# Patient Record
Sex: FEMALE | Race: WHITE | NOT HISPANIC OR LATINO | Employment: OTHER | URBAN - METROPOLITAN AREA
[De-identification: names, ages, dates, MRNs, and addresses within clinical notes are randomized per-mention and may not be internally consistent; named-entity substitution may affect disease eponyms.]

---

## 2017-05-23 ENCOUNTER — HOSPITAL ENCOUNTER (EMERGENCY)
Facility: HOSPITAL | Age: 67
Discharge: HOME | End: 2017-05-24
Attending: EMERGENCY MEDICINE
Payer: MEDICARE

## 2017-05-23 DIAGNOSIS — S60.221A CONTUSION OF RIGHT HAND, INITIAL ENCOUNTER: Primary | ICD-10-CM

## 2017-05-23 PROCEDURE — 99283 EMERGENCY DEPT VISIT LOW MDM: CPT

## 2017-05-24 VITALS
RESPIRATION RATE: 18 BRPM | HEART RATE: 70 BPM | TEMPERATURE: 98 F | BODY MASS INDEX: 35.34 KG/M2 | HEIGHT: 64 IN | DIASTOLIC BLOOD PRESSURE: 92 MMHG | SYSTOLIC BLOOD PRESSURE: 153 MMHG | WEIGHT: 207 LBS | OXYGEN SATURATION: 100 %
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
## 2017-06-07 DIAGNOSIS — S20.109A: Primary | ICD-10-CM

## 2017-06-07 DIAGNOSIS — N64.4 BREAST PAIN: ICD-10-CM

## 2017-06-08 ENCOUNTER — HOSPITAL ENCOUNTER (OUTPATIENT)
Dept: RADIOLOGY | Facility: HOSPITAL | Age: 67
Discharge: HOME | End: 2017-06-08
Attending: OBSTETRICS & GYNECOLOGY
Payer: MEDICARE

## 2017-06-08 DIAGNOSIS — S20.109A: ICD-10-CM

## 2017-06-08 DIAGNOSIS — N64.4 BREAST PAIN: ICD-10-CM

## 2017-06-08 PROCEDURE — 77062 BREAST TOMOSYNTHESIS BI: CPT | Mod: 26,,, | Performed by: RADIOLOGY

## 2017-06-08 PROCEDURE — 77062 BREAST TOMOSYNTHESIS BI: CPT | Mod: TC

## 2017-06-08 PROCEDURE — 77066 DX MAMMO INCL CAD BI: CPT | Mod: 26,,, | Performed by: RADIOLOGY

## 2017-06-08 PROCEDURE — 76642 ULTRASOUND BREAST LIMITED: CPT | Mod: 26,LT,, | Performed by: RADIOLOGY

## 2017-06-08 PROCEDURE — 76642 ULTRASOUND BREAST LIMITED: CPT | Mod: TC,LT

## 2019-02-18 DIAGNOSIS — Z12.39 ENCOUNTER FOR SCREENING FOR MALIGNANT NEOPLASM OF BREAST: Primary | ICD-10-CM

## 2019-02-19 ENCOUNTER — HOSPITAL ENCOUNTER (OUTPATIENT)
Dept: RADIOLOGY | Facility: HOSPITAL | Age: 69
Discharge: HOME | End: 2019-02-19
Attending: OBSTETRICS & GYNECOLOGY
Payer: MEDICARE

## 2019-02-19 DIAGNOSIS — Z12.39 ENCOUNTER FOR SCREENING FOR MALIGNANT NEOPLASM OF BREAST: ICD-10-CM

## 2019-02-19 PROCEDURE — 77067 MAMMO DIGITAL SCREENING BILAT WITH TOMOSYNTHESIS_CAD: ICD-10-PCS | Mod: 26,,, | Performed by: RADIOLOGY

## 2019-02-19 PROCEDURE — 77067 SCR MAMMO BI INCL CAD: CPT | Mod: TC

## 2019-02-19 PROCEDURE — 77063 MAMMO DIGITAL SCREENING BILAT WITH TOMOSYNTHESIS_CAD: ICD-10-PCS | Mod: 26,,, | Performed by: RADIOLOGY

## 2019-02-19 PROCEDURE — 77063 BREAST TOMOSYNTHESIS BI: CPT | Mod: 26,,, | Performed by: RADIOLOGY

## 2019-02-19 PROCEDURE — 77067 SCR MAMMO BI INCL CAD: CPT | Mod: 26,,, | Performed by: RADIOLOGY

## 2019-03-15 ENCOUNTER — OFFICE VISIT (OUTPATIENT)
Dept: PODIATRY | Facility: CLINIC | Age: 69
End: 2019-03-15
Payer: MEDICARE

## 2019-03-15 VITALS
HEART RATE: 83 BPM | SYSTOLIC BLOOD PRESSURE: 104 MMHG | HEIGHT: 65 IN | DIASTOLIC BLOOD PRESSURE: 73 MMHG | WEIGHT: 207 LBS | BODY MASS INDEX: 34.49 KG/M2

## 2019-03-15 DIAGNOSIS — L60.0 INGROWN NAIL: Primary | ICD-10-CM

## 2019-03-15 DIAGNOSIS — M79.674 PAIN OF RIGHT GREAT TOE: ICD-10-CM

## 2019-03-15 PROCEDURE — 11750 EXCISION NAIL&NAIL MATRIX: CPT | Mod: T5,,, | Performed by: PODIATRIST

## 2019-03-15 PROCEDURE — 99213 PR OFFICE/OUTPT VISIT, EST, LEVL III, 20-29 MIN: ICD-10-PCS | Mod: 25,,, | Performed by: PODIATRIST

## 2019-03-15 PROCEDURE — 99213 OFFICE O/P EST LOW 20 MIN: CPT | Mod: 25,,, | Performed by: PODIATRIST

## 2019-03-15 PROCEDURE — 11750 NAIL REMOVAL: ICD-10-PCS | Mod: T5,,, | Performed by: PODIATRIST

## 2019-03-15 RX ORDER — IBUPROFEN 800 MG/1
1 TABLET ORAL
COMMUNITY

## 2019-03-15 RX ORDER — LYSINE HCL 500 MG
4 TABLET ORAL
COMMUNITY
End: 2023-02-20

## 2019-03-15 RX ORDER — CALCIUM CARBONATE 600 MG
TABLET ORAL
COMMUNITY
End: 2023-02-20

## 2019-03-15 RX ORDER — HYDROCODONE BITARTRATE AND ACETAMINOPHEN 7.5; 325 MG/1; MG/1
1 TABLET ORAL EVERY 6 HOURS PRN
Qty: 10 TABLET | Refills: 0 | Status: SHIPPED | OUTPATIENT
Start: 2019-03-15 | End: 2019-03-15 | Stop reason: CLARIF

## 2019-03-15 RX ORDER — VITAMIN B COMPLEX
1 CAPSULE ORAL DAILY
COMMUNITY
End: 2023-02-20

## 2019-03-15 RX ORDER — B-COMPLEX WITH VITAMIN C
1 TABLET ORAL
COMMUNITY
End: 2023-02-20

## 2019-03-15 RX ORDER — CEPHALEXIN 500 MG/1
500 CAPSULE ORAL EVERY 12 HOURS
Qty: 20 CAPSULE | Refills: 0 | Status: SHIPPED | OUTPATIENT
Start: 2019-03-15 | End: 2019-03-15

## 2019-03-15 RX ORDER — CEPHALEXIN 500 MG/1
500 CAPSULE ORAL EVERY 12 HOURS
Qty: 20 CAPSULE | Refills: 0 | Status: SHIPPED | OUTPATIENT
Start: 2019-03-15 | End: 2019-03-25

## 2019-03-15 RX ORDER — HYDROCODONE BITARTRATE AND ACETAMINOPHEN 7.5; 325 MG/1; MG/1
1 TABLET ORAL EVERY 6 HOURS PRN
Qty: 10 TABLET | Refills: 0 | Status: SHIPPED | OUTPATIENT
Start: 2019-03-15 | End: 2019-03-25

## 2019-03-15 RX ORDER — DIPHENHYDRAMINE HCL 25 MG
CAPSULE ORAL
COMMUNITY

## 2019-03-15 RX ORDER — SULFASALAZINE 500 MG/1
4 TABLET ORAL
COMMUNITY
End: 2023-02-20

## 2019-03-15 NOTE — PATIENT INSTRUCTIONS

## 2019-03-15 NOTE — PROGRESS NOTES
[address]  [place] [address]  Phone: [phone]   [phone]    Patient's PCP:Seema Cueva MD  Referring Provider: No ref. provider found    Subjective:      Chief Complaint:: Ingrown Toenail (right first toe)    ERASTO Anthony is a 68 y.o. female who presents with a complaint of  Ingrown toenail right first bilateral borders .  Current symptoms include pain.  Aggravating factors are closed toe shoes. Symptoms have gotten worse.Treatment to date have included saoks and pedicure. Patients rates pain 0/10 on pain scale.      Vitals:    03/15/19 1412   BP: 104/73   Pulse: 83     Shoe Size:     Past Surgical History:   Procedure Laterality Date    ABDOMINAL SURGERY      tummy tuck    BREAST BIOPSY      COLECTOMY-LAPAROSCOPIC Left 5/13/2014    [facility]    eye lid surgery      Bilateral    HAND SURGERY      HEMICOLECTOMY, LEFT Left 5/13/2014    [facility] at [facility]    HYSTERECTOMY      KNEE SURGERY      NECK SURGERY      SHOULDER SURGERY      TONSILLECTOMY       Past Medical History:   Diagnosis Date    Brain lesion 2011    Benign - no Tx    Diverticulitis     Genital herpes     Hepatitis A     Hyperlipidemia      History reviewed. No pertinent family history.     Social History:   Marital Status:   Alcohol History:  reports that she drinks alcohol.  Tobacco History:  reports that  has never smoked. she has never used smokeless tobacco.  Drug History:  reports that she does not use drugs.    Review of patient's allergies indicates:   Allergen Reactions    Corticosteroids (glucocorticoids)     Demerol [meperidine]     Unable to assess      aspartane       Current Outpatient Medications   Medication Sig Dispense Refill    ascorbic acid, vitamin C, (VITAMIN C) 100 MG tablet Take 100 mg by mouth once daily.      b complex vitamins capsule Take 1 capsule by mouth once daily.       B-complex with vitamin C (SUPER B COMPLEX-VITAMIN C ORAL) Super B Complex-Vitamin C      B-complex with vitamin C (Z-BEC OR EQUIV) tablet Take 1 tablet by mouth.      calcium carbonate (SUPER CALCIUM) 600 mg calcium (1,500 mg) Tab Super Calcium      calcium carbonate-mag oxide (SUPER BRAYAN-MAG) 333-167 mg Tab Super C      cephALEXin (KEFLEX) 500 MG capsule Take 1 capsule (500 mg total) by mouth every 12 (twelve) hours. for 10 days 20 capsule 0    diphenhydrAMINE (BENADRYL) 25 mg capsule Benadryl 25 mg capsule   Take 1 capsule every 4 hours by oral route.      garlic 1,500 mg Cap Take 4 capsules by mouth.      HYDROcodone-acetaminophen (NORCO) 7.5-325 mg per tablet Take 1 tablet by mouth every 6 (six) hours as needed for Pain. 10 tablet 0    ibuprofen (ADVIL,MOTRIN) 800 MG tablet Take 1 tablet by mouth.      multivitamin with iron (DAILY MULTIPLE VITAMINS/IRON ORAL) Take 2 tablets by mouth.      multivitamin with minerals tablet Take 1 tablet by mouth once daily.      sulfaSALAzine (AZULFIDINE) 500 mg Tab Take 4 capsules by mouth.      zinc sulfate (ZINC-15) 66 mg Tab zinc       No current facility-administered medications for this visit.        Review of Systems   Constitutional: Negative for chills, fatigue, fever and unexpected weight change.   HENT: Negative for hearing loss and trouble swallowing.    Eyes: Negative for photophobia and visual disturbance.   Respiratory: Negative for cough, shortness of breath and wheezing.    Cardiovascular: Negative for chest pain, palpitations and leg swelling.   Gastrointestinal: Negative for abdominal pain and nausea.   Genitourinary: Negative for dysuria and frequency.   Musculoskeletal: Negative for arthralgias, back pain and joint swelling.   Skin: Negative for rash.   Neurological: Negative for tremors, seizures, weakness, numbness and headaches.   Hematological: Does not bruise/bleed easily.         Objective:        Physical Exam:   Foot  Exam    General  General Appearance: appears stated age and healthy   Orientation: alert and oriented to person, place, and time   Affect: appropriate   Gait: unimpaired       Right Foot/Ankle     Neurovascular  Dorsalis pedis: 2+  Posterior tibial: 2+  Saphenous nerve sensation: normal  Tibial nerve sensation: normal  Superficial peroneal nerve sensation: normal  Deep peroneal nerve sensation: normal  Sural nerve sensation: normal    Comments  Ingrown toenail medial and lateral right first toe no purulent drainage  pain to palpation medial and lateral border.        Physical Exam   Cardiovascular:   Pulses:       Dorsalis pedis pulses are 2+ on the right side.        Posterior tibial pulses are 2+ on the right side.       Imaging:            Assessment:       1. Ingrown nail - Right Foot    2. Pain of right great toe      Plan:   Ingrown nail - Right Foot  -     cephALEXin (KEFLEX) 500 MG capsule; Take 1 capsule (500 mg total) by mouth every 12 (twelve) hours. for 10 days  Dispense: 20 capsule; Refill: 0    Pain of right great toe    Other orders  -     Discontinue: cephALEXin (KEFLEX) 500 MG capsule; Take 1 capsule (500 mg total) by mouth every 12 (twelve) hours. for 10 days  Dispense: 20 capsule; Refill: 0  -     Discontinue: HYDROcodone-acetaminophen (NORCO) 7.5-325 mg per tablet; Take 1 tablet by mouth every 6 (six) hours as needed for Pain.  Dispense: 10 tablet; Refill: 0  -     Nail Removal  -     HYDROcodone-acetaminophen (NORCO) 7.5-325 mg per tablet; Take 1 tablet by mouth every 6 (six) hours as needed for Pain.  Dispense: 10 tablet; Refill: 0      Follow-up in about 2 weeks (around 3/29/2019) for Follow up P&A.    Nail Removal  Date/Time: 3/15/2019 2:36 PM  Performed by: Randy Guerra DPM  Authorized by: Randy Guerra DPM     Consent Done?:  Yes (Written)    Location:  Right foot  Location detail:  Right big toe  Anesthesia:  Digital block  Local anesthetic: lidocaine 2% without  epinephrine  Anesthetic total (ml):  5  Preparation:  Skin prepped with alcohol    Amount removed:  1/5  Nail removed location: Medial and lateral border.  Wedge excision of skin of nail fold: No    Nail bed sutured?: No    Nail matrix removed:  Partial  Removed nail replaced and anchored: No    Dressing applied:  4x4 and gauze roll  Patient tolerance:  Patient tolerated the procedure well with no immediate complications     - None    Counseling:     I provided patient education verbally regarding:   Patient diagnosis, treatment options, as well as alternatives, risks, and benefits.     This note was created using Dragon voice recognition software that occasionally misinterpreted phrases or words.

## 2019-03-18 ENCOUNTER — TELEPHONE (OUTPATIENT)
Dept: PODIATRY | Facility: CLINIC | Age: 69
End: 2019-03-18

## 2019-03-18 NOTE — TELEPHONE ENCOUNTER
----- Message from Madie Farley sent at 3/18/2019  8:36 AM CDT -----  Contact: SELF  Patient left a voice mail Friday evening that her pain medicine was not at her pharmacy. Please call her at the above number.  Thank you!  Madie Box was sent to [facility] [place].Left message on patient's vm to  Make sure correct pharmacy.

## 2019-03-28 ENCOUNTER — OFFICE VISIT (OUTPATIENT)
Dept: PODIATRY | Facility: CLINIC | Age: 69
End: 2019-03-28
Payer: MEDICARE

## 2019-03-28 VITALS
HEART RATE: 88 BPM | HEIGHT: 65 IN | DIASTOLIC BLOOD PRESSURE: 76 MMHG | SYSTOLIC BLOOD PRESSURE: 120 MMHG | OXYGEN SATURATION: 99 % | RESPIRATION RATE: 20 BRPM | WEIGHT: 207 LBS | BODY MASS INDEX: 34.49 KG/M2

## 2019-03-28 DIAGNOSIS — M79.674 PAIN OF RIGHT GREAT TOE: ICD-10-CM

## 2019-03-28 DIAGNOSIS — L60.0 INGROWN NAIL: Primary | ICD-10-CM

## 2019-03-28 PROCEDURE — 99213 PR OFFICE/OUTPT VISIT, EST, LEVL III, 20-29 MIN: ICD-10-PCS | Mod: ,,, | Performed by: PODIATRIST

## 2019-03-28 PROCEDURE — 99213 OFFICE O/P EST LOW 20 MIN: CPT | Mod: ,,, | Performed by: PODIATRIST

## 2019-03-28 NOTE — PROGRESS NOTES
[address]  [place] [address]  Phone: [phone]   [phone]    Patient's PCP:Seema Cueva MD  Referring Provider: No ref. provider found    Subjective:      Chief Complaint:: Follow-up (matrixectomy)    HPI  Carleen Anthony is a 68 y.o. female who presents for a follow up matrixectomy right great toe (medial and lateral border) , doing great. Current symptoms include slight tenderness. Symptoms have improved since last visit. Patients rates pain 1/10 on pain scale.      Vitals:    03/28/19 1523   BP: 120/76   Pulse: 88   Resp: 20     Shoe Size: 8/8.5    Past Surgical History:   Procedure Laterality Date    ABDOMINAL SURGERY      tummy tuck    BREAST BIOPSY      COLECTOMY-LAPAROSCOPIC Left 5/13/2014    [facility]    eye lid surgery      Bilateral    HAND SURGERY      HEMICOLECTOMY, LEFT Left 5/13/2014    [facility]    HYSTERECTOMY      KNEE SURGERY      NECK SURGERY      SHOULDER SURGERY      TONSILLECTOMY       Past Medical History:   Diagnosis Date    Brain lesion 2011    Benign - no Tx    Diverticulitis     Genital herpes     Hepatitis A     Hyperlipidemia      History reviewed. No pertinent family history.     Social History:   Marital Status:   Alcohol History:  reports that she drinks alcohol.  Tobacco History:  reports that she has never smoked. She has never used smokeless tobacco.  Drug History:  reports that she does not use drugs.    Review of patient's allergies indicates:   Allergen Reactions    Corticosteroids (glucocorticoids)     Demerol [meperidine]     Unable to assess      aspartane       Current Outpatient Medications   Medication Sig Dispense Refill    ascorbic acid, vitamin C, (VITAMIN C) 100 MG tablet Take 100 mg by mouth once daily.      b complex vitamins capsule Take 1 capsule by mouth once daily.      B-complex with vitamin C (SUPER B  COMPLEX-VITAMIN C ORAL) Super B Complex-Vitamin C      B-complex with vitamin C (Z-BEC OR EQUIV) tablet Take 1 tablet by mouth.      calcium carbonate (SUPER CALCIUM) 600 mg calcium (1,500 mg) Tab Super Calcium      calcium carbonate-mag oxide (SUPER BRAYAN-MAG) 333-167 mg Tab Super C      diphenhydrAMINE (BENADRYL) 25 mg capsule Benadryl 25 mg capsule   Take 1 capsule every 4 hours by oral route.      garlic 1,500 mg Cap Take 4 capsules by mouth.      ibuprofen (ADVIL,MOTRIN) 800 MG tablet Take 1 tablet by mouth.      multivitamin with iron (DAILY MULTIPLE VITAMINS/IRON ORAL) Take 2 tablets by mouth.      multivitamin with minerals tablet Take 1 tablet by mouth once daily.      sulfaSALAzine (AZULFIDINE) 500 mg Tab Take 4 capsules by mouth.      zinc sulfate (ZINC-15) 66 mg Tab zinc       No current facility-administered medications for this visit.        Review of Systems   Constitutional: Negative for chills, fatigue, fever and unexpected weight change.   HENT: Negative for hearing loss and trouble swallowing.    Eyes: Negative for photophobia and visual disturbance.   Respiratory: Negative for cough, shortness of breath and wheezing.    Cardiovascular: Negative for chest pain, palpitations and leg swelling.   Gastrointestinal: Negative for abdominal pain and nausea.   Genitourinary: Negative for dysuria and frequency.   Musculoskeletal: Negative for arthralgias, back pain and joint swelling.   Skin: Negative for rash.   Neurological: Negative for tremors, seizures, weakness, numbness and headaches.   Hematological: Does not bruise/bleed easily.         Objective:      Physical Exam:   Foot Exam    General  General Appearance: appears stated age and healthy   Orientation: alert and oriented to person, place, and time   Affect: appropriate   Gait: unimpaired         Post op P&A of the right first toe  medial and lateral  shows normal healing without signs of infection.   Physical Exam    Imaging:             Assessment:       1. Ingrown nail - Right Foot    2. Pain of right great toe      Plan:   Ingrown nail - Right Foot    Pain of right great toe    Daily dressings until dry. Conditionally discharge return as needed.  Follow up if symptoms worsen or fail to improve.    Procedures - None    Counseling:     I provided patient education verbally regarding:   Patient diagnosis, treatment options, as well as alternatives, risks, and benefits.     This note was created using Dragon voice recognition software that occasionally misinterpreted phrases or words.

## 2020-10-25 ENCOUNTER — HOSPITAL ENCOUNTER (EMERGENCY)
Facility: HOSPITAL | Age: 70
Discharge: HOME | End: 2020-10-26
Attending: EMERGENCY MEDICINE
Payer: MEDICARE

## 2020-10-25 VITALS
DIASTOLIC BLOOD PRESSURE: 60 MMHG | OXYGEN SATURATION: 95 % | SYSTOLIC BLOOD PRESSURE: 118 MMHG | TEMPERATURE: 99 F | BODY MASS INDEX: 33.63 KG/M2 | WEIGHT: 197 LBS | HEART RATE: 82 BPM | RESPIRATION RATE: 20 BRPM | HEIGHT: 64 IN

## 2020-10-25 DIAGNOSIS — J18.9 PNEUMONIA OF RIGHT LOWER LOBE DUE TO INFECTIOUS ORGANISM: Primary | ICD-10-CM

## 2020-10-25 DIAGNOSIS — Z20.822 SUSPECTED COVID-19 VIRUS INFECTION: ICD-10-CM

## 2020-10-25 LAB
ALBUMIN SERPL BCP-MCNC: 4 G/DL (ref 3.5–5.2)
ALP SERPL-CCNC: 65 U/L (ref 55–135)
ALT SERPL W/O P-5'-P-CCNC: 20 U/L (ref 10–44)
ANION GAP SERPL CALC-SCNC: 11 MMOL/L (ref 8–16)
AST SERPL-CCNC: 19 U/L (ref 10–40)
BASOPHILS # BLD AUTO: 0.05 K/UL (ref 0–0.2)
BASOPHILS NFR BLD: 0.4 % (ref 0–1.9)
BILIRUB SERPL-MCNC: 1.3 MG/DL (ref 0.1–1)
BNP SERPL-MCNC: 38 PG/ML (ref 0–99)
BUN SERPL-MCNC: 10 MG/DL (ref 8–23)
CALCIUM SERPL-MCNC: 9 MG/DL (ref 8.7–10.5)
CHLORIDE SERPL-SCNC: 102 MMOL/L (ref 95–110)
CK SERPL-CCNC: 61 U/L (ref 20–180)
CO2 SERPL-SCNC: 24 MMOL/L (ref 23–29)
CREAT SERPL-MCNC: 0.6 MG/DL (ref 0.5–1.4)
CRP SERPL-MCNC: 11.62 MG/DL
CRP SERPL-MCNC: 11.62 MG/DL
DIFFERENTIAL METHOD: ABNORMAL
EOSINOPHIL # BLD AUTO: 0.1 K/UL (ref 0–0.5)
EOSINOPHIL NFR BLD: 0.4 % (ref 0–8)
ERYTHROCYTE [DISTWIDTH] IN BLOOD BY AUTOMATED COUNT: 12.7 % (ref 11.5–14.5)
ERYTHROCYTE [SEDIMENTATION RATE] IN BLOOD BY WESTERGREN METHOD: 20 MM/HR (ref 0–20)
EST. GFR  (AFRICAN AMERICAN): >60 ML/MIN/1.73 M^2
EST. GFR  (NON AFRICAN AMERICAN): >60 ML/MIN/1.73 M^2
FERRITIN SERPL-MCNC: 83 NG/ML (ref 20–300)
FERRITIN SERPL-MCNC: 83 NG/ML (ref 20–300)
GLUCOSE SERPL-MCNC: 112 MG/DL (ref 70–110)
HCT VFR BLD AUTO: 38.5 % (ref 37–48.5)
HGB BLD-MCNC: 12.9 G/DL (ref 12–16)
IMM GRANULOCYTES # BLD AUTO: 0.1 K/UL (ref 0–0.04)
IMM GRANULOCYTES NFR BLD AUTO: 0.7 % (ref 0–0.5)
INFLUENZA A, MOLECULAR: NEGATIVE
INFLUENZA B, MOLECULAR: NEGATIVE
LACTATE SERPL-SCNC: 1 MMOL/L (ref 0.5–1.9)
LDH SERPL L TO P-CCNC: 149 U/L (ref 110–260)
LYMPHOCYTES # BLD AUTO: 1.4 K/UL (ref 1–4.8)
LYMPHOCYTES NFR BLD: 9.8 % (ref 18–48)
MCH RBC QN AUTO: 29.7 PG (ref 27–31)
MCHC RBC AUTO-ENTMCNC: 33.5 G/DL (ref 32–36)
MCV RBC AUTO: 89 FL (ref 82–98)
MONOCYTES # BLD AUTO: 0.7 K/UL (ref 0.3–1)
MONOCYTES NFR BLD: 4.7 % (ref 4–15)
NEUTROPHILS # BLD AUTO: 11.7 K/UL (ref 1.8–7.7)
NEUTROPHILS NFR BLD: 84 % (ref 38–73)
NRBC BLD-RTO: 0 /100 WBC
PLATELET # BLD AUTO: 182 K/UL (ref 150–350)
PMV BLD AUTO: 10.5 FL (ref 9.2–12.9)
POTASSIUM SERPL-SCNC: 3.6 MMOL/L (ref 3.5–5.1)
PROCALCITONIN SERPL IA-MCNC: 0.07 NG/ML (ref 0–0.5)
PROT SERPL-MCNC: 6.7 G/DL (ref 6–8.4)
RBC # BLD AUTO: 4.34 M/UL (ref 4–5.4)
SARS-COV-2 RDRP RESP QL NAA+PROBE: NEGATIVE
SODIUM SERPL-SCNC: 137 MMOL/L (ref 136–145)
SPECIMEN SOURCE: NORMAL
TROPONIN I SERPL DL<=0.01 NG/ML-MCNC: <0.03 NG/ML
WBC # BLD AUTO: 13.9 K/UL (ref 3.9–12.7)

## 2020-10-25 PROCEDURE — 93010 ELECTROCARDIOGRAM REPORT: CPT | Mod: ,,, | Performed by: SPECIALIST

## 2020-10-25 PROCEDURE — 80053 COMPREHEN METABOLIC PANEL: CPT

## 2020-10-25 PROCEDURE — 99285 EMERGENCY DEPT VISIT HI MDM: CPT | Mod: 25

## 2020-10-25 PROCEDURE — 83605 ASSAY OF LACTIC ACID: CPT

## 2020-10-25 PROCEDURE — 86140 C-REACTIVE PROTEIN: CPT

## 2020-10-25 PROCEDURE — 82728 ASSAY OF FERRITIN: CPT

## 2020-10-25 PROCEDURE — 93010 EKG 12-LEAD: ICD-10-PCS | Mod: ,,, | Performed by: SPECIALIST

## 2020-10-25 PROCEDURE — 87502 INFLUENZA DNA AMP PROBE: CPT

## 2020-10-25 PROCEDURE — 87040 BLOOD CULTURE FOR BACTERIA: CPT | Mod: 59

## 2020-10-25 PROCEDURE — 84484 ASSAY OF TROPONIN QUANT: CPT

## 2020-10-25 PROCEDURE — 93005 ELECTROCARDIOGRAM TRACING: CPT | Performed by: SPECIALIST

## 2020-10-25 PROCEDURE — 83880 ASSAY OF NATRIURETIC PEPTIDE: CPT

## 2020-10-25 PROCEDURE — 82550 ASSAY OF CK (CPK): CPT

## 2020-10-25 PROCEDURE — 85651 RBC SED RATE NONAUTOMATED: CPT

## 2020-10-25 PROCEDURE — 96375 TX/PRO/DX INJ NEW DRUG ADDON: CPT

## 2020-10-25 PROCEDURE — 96361 HYDRATE IV INFUSION ADD-ON: CPT

## 2020-10-25 PROCEDURE — 63600175 PHARM REV CODE 636 W HCPCS: Performed by: EMERGENCY MEDICINE

## 2020-10-25 PROCEDURE — 83615 LACTATE (LD) (LDH) ENZYME: CPT

## 2020-10-25 PROCEDURE — 96365 THER/PROPH/DIAG IV INF INIT: CPT

## 2020-10-25 PROCEDURE — U0002 COVID-19 LAB TEST NON-CDC: HCPCS

## 2020-10-25 PROCEDURE — 84145 PROCALCITONIN (PCT): CPT

## 2020-10-25 PROCEDURE — 85025 COMPLETE CBC W/AUTO DIFF WBC: CPT

## 2020-10-25 RX ORDER — SODIUM CHLORIDE, SODIUM LACTATE, POTASSIUM CHLORIDE, CALCIUM CHLORIDE 600; 310; 30; 20 MG/100ML; MG/100ML; MG/100ML; MG/100ML
500 INJECTION, SOLUTION INTRAVENOUS
Status: COMPLETED | OUTPATIENT
Start: 2020-10-25 | End: 2020-10-25

## 2020-10-25 RX ORDER — LEVOFLOXACIN 5 MG/ML
750 INJECTION, SOLUTION INTRAVENOUS
Status: COMPLETED | OUTPATIENT
Start: 2020-10-26 | End: 2020-10-26

## 2020-10-25 RX ORDER — DIPHENHYDRAMINE HYDROCHLORIDE 50 MG/ML
12.5 INJECTION INTRAMUSCULAR; INTRAVENOUS
Status: COMPLETED | OUTPATIENT
Start: 2020-10-25 | End: 2020-10-25

## 2020-10-25 RX ORDER — METOCLOPRAMIDE HYDROCHLORIDE 5 MG/ML
10 INJECTION INTRAMUSCULAR; INTRAVENOUS
Status: COMPLETED | OUTPATIENT
Start: 2020-10-25 | End: 2020-10-25

## 2020-10-25 RX ADMIN — SODIUM CHLORIDE, SODIUM LACTATE, POTASSIUM CHLORIDE, AND CALCIUM CHLORIDE 500 ML: .6; .31; .03; .02 INJECTION, SOLUTION INTRAVENOUS at 09:10

## 2020-10-25 RX ADMIN — DIPHENHYDRAMINE HYDROCHLORIDE 12.5 MG: 50 INJECTION INTRAMUSCULAR; INTRAVENOUS at 09:10

## 2020-10-25 RX ADMIN — METOCLOPRAMIDE 10 MG: 5 INJECTION, SOLUTION INTRAMUSCULAR; INTRAVENOUS at 09:10

## 2020-10-26 PROCEDURE — 63600175 PHARM REV CODE 636 W HCPCS: Performed by: EMERGENCY MEDICINE

## 2020-10-26 RX ORDER — LEVOFLOXACIN 750 MG/1
750 TABLET ORAL DAILY
Qty: 10 TABLET | Refills: 0 | Status: SHIPPED | OUTPATIENT
Start: 2020-10-26 | End: 2021-01-18

## 2020-10-26 RX ADMIN — LEVOFLOXACIN 750 MG: 750 INJECTION, SOLUTION INTRAVENOUS at 12:10

## 2020-10-26 NOTE — ED PROVIDER NOTES
Encounter Date: 10/25/2020       History     Chief Complaint   Patient presents with    Fever    Headache    Cough     61-year-old with history diverticulitis, meningioma, hyperlipidemia.  Patient presents emergency department with complaint of cough, body aches, this URI symptoms dyspnea hives, nasal congestion follow-up in associated bitemporal frontal headache since yesterday.  Patient states she feels like she has flu-like symptoms.  She has had also nausea, diarrhea which has resolved.  She denies any focal neural deficits, no neck stiffness, no nuchal rigidity, no ill contacts, no loss of taste or smell.        Review of patient's allergies indicates:   Allergen Reactions    Corticosteroids (glucocorticoids)     Demerol [meperidine]     Unable to assess      aspartane     Past Medical History:   Diagnosis Date    Brain lesion 2011    Benign - no Tx    Diverticulitis     Genital herpes     Hepatitis A     Hyperlipidemia      Past Surgical History:   Procedure Laterality Date    ABDOMINAL SURGERY      tummy tuck    BREAST BIOPSY      eye lid surgery      Bilateral    HAND SURGERY      HYSTERECTOMY      KNEE SURGERY      NECK SURGERY      SHOULDER SURGERY      TONSILLECTOMY       No family history on file.  Social History     Tobacco Use    Smoking status: Never Smoker    Smokeless tobacco: Never Used   Substance Use Topics    Alcohol use: Yes     Comment: occasionally    Drug use: No     Review of Systems   Constitutional: Positive for chills, fatigue and fever.   HENT: Positive for congestion and rhinorrhea. Negative for sore throat.    Respiratory: Positive for cough. Negative for shortness of breath and wheezing.    Cardiovascular: Negative for chest pain and palpitations.   Gastrointestinal: Positive for diarrhea and nausea. Negative for abdominal pain and vomiting.   Genitourinary: Negative for dysuria.   Musculoskeletal: Negative for back pain.   Skin: Negative for rash.    Neurological: Positive for headaches. Negative for seizures, speech difficulty, weakness, light-headedness and numbness.   Hematological: Does not bruise/bleed easily.       Physical Exam     Initial Vitals [10/25/20 1955]   BP Pulse Resp Temp SpO2   133/68 96 16 99.1 °F (37.3 °C) 95 %      MAP       --         Physical Exam    Nursing note and vitals reviewed.  Constitutional: She appears well-developed and well-nourished.   Positive congestion    HENT:   Head: Normocephalic and atraumatic.   Nose: Nose normal.   Mouth/Throat: Oropharynx is clear and moist.   Eyes: Conjunctivae and EOM are normal. Pupils are equal, round, and reactive to light.   Neck: Normal range of motion. Neck supple. No thyromegaly present. No tracheal deviation present.   Cardiovascular: Normal rate, regular rhythm, normal heart sounds and intact distal pulses. Exam reveals no gallop and no friction rub.    No murmur heard.  Pulmonary/Chest: Breath sounds normal. No stridor. No respiratory distress.   Course bilateral breath sounds no adventitious sounds   Abdominal: Soft. Bowel sounds are normal. She exhibits no mass. There is no rebound and no guarding.   Musculoskeletal: Normal range of motion. No edema.   Lymphadenopathy:     She has no cervical adenopathy.   Neurological: She is alert and oriented to person, place, and time. She has normal strength and normal reflexes. GCS score is 15. GCS eye subscore is 4. GCS verbal subscore is 5. GCS motor subscore is 6.   Skin: Skin is warm and dry. Capillary refill takes less than 2 seconds.   Psychiatric: She has a normal mood and affect.         ED Course   Procedures  Labs Reviewed   CBC W/ AUTO DIFFERENTIAL - Abnormal; Notable for the following components:       Result Value    WBC 13.90 (*)     Immature Granulocytes 0.7 (*)     Gran # (ANC) 11.7 (*)     Immature Grans (Abs) 0.10 (*)     Gran% 84.0 (*)     Lymph% 9.8 (*)     All other components within normal limits   COMPREHENSIVE METABOLIC  PANEL - Abnormal; Notable for the following components:    Glucose 112 (*)     Total Bilirubin 1.3 (*)     All other components within normal limits   C-REACTIVE PROTEIN - Abnormal; Notable for the following components:    CRP 11.62 (*)     All other components within normal limits   C-REACTIVE PROTEIN - Abnormal; Notable for the following components:    CRP 11.62 (*)     All other components within normal limits   CULTURE, BLOOD   CULTURE, BLOOD   FERRITIN   LACTATE DEHYDROGENASE   CK   LACTIC ACID, PLASMA   TROPONIN I   SARS-COV-2 RNA AMPLIFICATION, QUAL   PROCALCITONIN   B-TYPE NATRIURETIC PEPTIDE   SEDIMENTATION RATE   FERRITIN   INFLUENZA A AND B ANTIGEN    Narrative:     Specimen Source->Nasopharyngeal Swab          Imaging Results          X-Ray Chest AP Portable (In process)                  Medical Decision Making:   Initial Assessment:   69-year-old female with history of meningioma, hyperlipidemia, hepatitis-A here with complaint of flu-like symptoms since yesterday  Differential Diagnosis:   Influenza, COVID 19 NANI-2 infection, pneumonia, URI, viral infection, enterovirus  Clinical Tests:   Lab Tests: Ordered and Reviewed  Radiological Study: Ordered and Reviewed  Medical Tests: Ordered and Reviewed  Sepsis Perfusion Assessment: I attest, a sepsis perfusion exam was performed within 6 hours of Septic Shock presentation, following fluid resuscitation.  ED Management:  Patient seen evaluated emergency department.  Patient found with white count 08102.  Patient did have S base infiltrate at the right lower lobe consistent with lobar pneumonia.  Patient was not found to be hypoxic, had no respiratory distress, was not septic appearing.  COVID negative.  Patient will be given Levaquin 750 daily for next 10 days.  Patient instructed follow up primary care physician next week.  If patient's symptoms persist she was instructed however that she would need possible repeat COVID PCR.  She is to return if shortness  of breath or additional concerns.                             Clinical Impression:       ICD-10-CM ICD-9-CM   1. Pneumonia of right lower lobe due to infectious organism  J18.9 486   2. Suspected COVID-19 virus infection  Z20.828 V01.79                          ED Disposition Condition    Discharge Stable        ED Prescriptions     Medication Sig Dispense Start Date End Date Auth. Provider    levoFLOXacin (LEVAQUIN) 750 MG tablet Take 1 tablet (750 mg total) by mouth once daily. 10 tablet 10/26/2020  Rey Anderson MD        Follow-up Information     Follow up With Specialties Details Why Contact Info    Seema Cueva MD Family Medicine Schedule an appointment as soon as possible for a visit  For recheck/continuing care [facility] [address] [address]  [phone]                                         Rey Anderson MD  10/25/20 2023       Rey Anderson MD  10/26/20 0022

## 2020-10-30 LAB
BACTERIA BLD CULT: NORMAL
BACTERIA BLD CULT: NORMAL

## 2021-01-18 ENCOUNTER — OFFICE VISIT (OUTPATIENT)
Dept: PODIATRY | Facility: CLINIC | Age: 71
End: 2021-01-18
Payer: MEDICARE

## 2021-01-18 VITALS — WEIGHT: 197 LBS | BODY MASS INDEX: 33.63 KG/M2 | HEIGHT: 64 IN

## 2021-01-18 DIAGNOSIS — M79.674 PAIN OF RIGHT GREAT TOE: ICD-10-CM

## 2021-01-18 DIAGNOSIS — L60.0 INGROWN NAIL: Primary | ICD-10-CM

## 2021-01-18 PROCEDURE — 99214 OFFICE O/P EST MOD 30 MIN: CPT | Mod: S$GLB,,, | Performed by: PODIATRIST

## 2021-01-18 PROCEDURE — 99214 PR OFFICE/OUTPT VISIT, EST, LEVL IV, 30-39 MIN: ICD-10-PCS | Mod: S$GLB,,, | Performed by: PODIATRIST

## 2021-01-18 RX ORDER — THYROID, PORCINE 30 MG/1
TABLET ORAL
COMMUNITY
Start: 2020-11-17 | End: 2023-02-20

## 2021-01-18 RX ORDER — CEPHALEXIN 500 MG/1
500 CAPSULE ORAL EVERY 12 HOURS
Qty: 20 CAPSULE | Refills: 1 | Status: SHIPPED | OUTPATIENT
Start: 2021-01-18 | End: 2021-01-28

## 2021-01-18 RX ORDER — ZOLPIDEM TARTRATE 10 MG/1
TABLET ORAL
COMMUNITY
Start: 2020-02-14 | End: 2023-02-20

## 2021-01-18 RX ORDER — PREDNISOLONE ACETATE 10 MG/ML
SUSPENSION/ DROPS OPHTHALMIC
COMMUNITY
End: 2023-02-20

## 2021-01-18 RX ORDER — KETOROLAC TROMETHAMINE 5 MG/ML
SOLUTION OPHTHALMIC
COMMUNITY
End: 2023-02-20

## 2023-02-20 ENCOUNTER — OFFICE VISIT (OUTPATIENT)
Dept: PODIATRY | Facility: CLINIC | Age: 73
End: 2023-02-20
Payer: MEDICARE

## 2023-02-20 VITALS — WEIGHT: 197 LBS | HEIGHT: 64 IN | BODY MASS INDEX: 33.63 KG/M2 | RESPIRATION RATE: 18 BRPM

## 2023-02-20 DIAGNOSIS — L60.0 INGROWN NAIL OF GREAT TOE OF LEFT FOOT: Primary | ICD-10-CM

## 2023-02-20 DIAGNOSIS — L60.0 INGROWN NAIL OF GREAT TOE OF RIGHT FOOT: ICD-10-CM

## 2023-02-20 PROCEDURE — 99213 PR OFFICE/OUTPT VISIT, EST, LEVL III, 20-29 MIN: ICD-10-PCS | Mod: S$GLB,,, | Performed by: PODIATRIST

## 2023-02-20 PROCEDURE — 99213 OFFICE O/P EST LOW 20 MIN: CPT | Mod: S$GLB,,, | Performed by: PODIATRIST

## 2023-02-20 RX ORDER — PNV NO.95/FERROUS FUM/FOLIC AC 28MG-0.8MG
500 TABLET ORAL
COMMUNITY

## 2023-02-20 RX ORDER — ASPIRIN 81 MG/1
1 TABLET ORAL EVERY MORNING
COMMUNITY

## 2023-02-20 NOTE — PROGRESS NOTES
"  [address]  [place] [address]  Phone: [phone]   [phone]    Patient's PCP:Seema Cueva MD  Referring Provider: No ref. provider found    Subjective:      Chief Complaint:: Toe Pain (Bilateral great toe left lateral, right medial ) and Ingrown Toenail (Possible bilateral great toe ingrown nails)    HPI  Carleen Anthony is a 72 y.o. female who presents today with a complaint of bilateral great toe pain right medial border and left lateral lasting for over a year intermittently. Onset of symptoms intermittent pain and widening nail and reports no trauma.  Current symptoms include intermittent pain and widening nail.  Aggravating factors are none random occurrence. Symptoms have waxed and weaned. Treatment to date have included changing shoes and pedicures.    Vitals:    02/20/23 1046   Resp: 18   Weight: 89.4 kg (197 lb)   Height: 5' 4" (1.626 m)   PainSc: 0-No pain      Shoe Size: 8-8.5    Past Surgical History:   Procedure Laterality Date    ABDOMINAL SURGERY      tummy tuck    BREAST BIOPSY      eye lid surgery      Bilateral    HAND SURGERY      HYSTERECTOMY      KNEE SURGERY      NECK SURGERY      SHOULDER SURGERY      TONSILLECTOMY       Past Medical History:   Diagnosis Date    Brain lesion 2011    Benign - no Tx    Diverticulitis     Genital herpes     Hepatitis A     Hyperlipidemia      History reviewed. No pertinent family history.     Social History:   Marital Status:   Alcohol History:  reports current alcohol use.  Tobacco History:  reports that she has never smoked. She has never used smokeless tobacco.  Drug History:  reports no history of drug use.    Review of patient's allergies indicates:   Allergen Reactions    Iodine Anaphylaxis and Itching    Corticosteroids (glucocorticoids)     Meperidine Other (See Comments)    Oxycodone Itching    Unable to assess      aspartane       Current Outpatient Medications   Medication Sig Dispense Refill    " aspirin (ECOTRIN) 81 MG EC tablet Take 1 tablet by mouth every morning.      Ca carb-magnesium-caps-deondre 243--50 mg Tab Take by mouth.      calcium carbonate-mag oxide (SUPER BRAYAN-MAG) 333-167 mg Tab Super C      cyanocobalamin (VITAMIN B-12) 100 MCG tablet Take 500 mcg by mouth.      diphenhydrAMINE (BENADRYL) 25 mg capsule Benadryl 25 mg capsule   Take 1 capsule every 4 hours by oral route.      ibuprofen (ADVIL,MOTRIN) 800 MG tablet Take 1 tablet by mouth.      Lactobacillus rhamnosus GG (CULTURELLE) 10 billion cell capsule Take 1 capsule by mouth once daily.      multivitamin with iron (DAILY MULTIPLE VITAMINS/IRON ORAL) Take 2 tablets by mouth.      multivitamin with minerals tablet Take 1 tablet by mouth once daily.      zinc sulfate (ZINC-15) 66 mg Tab zinc       No current facility-administered medications for this visit.       Review of Systems      Objective:        Physical Exam:   Foot Exam    General  General Appearance: appears stated age and healthy   Orientation: alert and oriented to person, place, and time   Affect: appropriate   Gait: unimpaired       Right Foot/Ankle     Inspection and Palpation  Skin Exam: (Minimal of any pain medial border 1st toenail with incurvated nails but no obvious infection no severe ingrown toenails.)  Neurovascular  Dorsalis pedis: 2+  Posterior tibial: 2+  Capillary Refill: 2+  Saphenous nerve sensation: normal  Tibial nerve sensation: normal  Superficial peroneal nerve sensation: normal  Deep peroneal nerve sensation: normal  Sural nerve sensation: normal      Left Foot/Ankle      Inspection and Palpation  Skin Exam: (Little if any pain medial border 1st toenail with no obvious signs of infection with some incurvation.)  Neurovascular  Dorsalis pedis: 2+  Posterior tibial: 2+  Capillary refill: 2+  Saphenous nerve sensation: normal  Tibial nerve sensation: normal  Superficial peroneal nerve sensation: normal  Deep peroneal nerve sensation: normal  Sural nerve  sensation: normal      Physical Exam  Cardiovascular:      Pulses:           Dorsalis pedis pulses are 2+ on the right side and 2+ on the left side.        Posterior tibial pulses are 2+ on the right side and 2+ on the left side.                 Vascular Exam     Right Pulses  Dorsalis Pedis:      2+  Posterior Tibial:      2+        Left Pulses  Dorsalis Pedis:      2+  Posterior Tibial:      2+         Imaging:            Assessment:       1. Ingrown nail of great toe of left foot    2. Ingrown nail of great toe of right foot      Plan:   Ingrown nail of great toe of left foot    Ingrown nail of great toe of right foot    Evaluated patient has minimal discomfort minimal incurvation of the nails with no pain no infection that is seems to be serious.  She does get occasional pain but no shoe gear and activity level.  We discussed the options 1.  Do nothing 2.  She was put Vaseline on nails when she needs to cut them although they are not that badly incurvated or ingrown.  If they begin to hurt her too much pain gets worse she can return for matricectomy of the borders see if that gives her more relief.  She is had a matricectomy before been years ago appears to be healing normally might be slight incurvation of the nail.  Explained her sometimes nails get thick and deformed over time.  She return if she needs us do a matricectomy.      Ingrown toenail treatment options of no treatment, avulsion of nail border under local with regrowth of nail, chemical matrixectomy for attempted permanent correction of the problem. Patient was educated about daily dressing changes, soaks, and medications following removal of the nail.      Procedures          Counseling:     I provided patient education verbally regarding:   Patient diagnosis, treatment options, as well as alternatives, risks, and benefits.     This note was created using Dragon voice recognition software that occasionally misinterpreted phrases or words.

## 2023-07-15 ENCOUNTER — HOSPITAL ENCOUNTER (EMERGENCY)
Facility: HOSPITAL | Age: 73
Discharge: HOME | End: 2023-07-15
Attending: EMERGENCY MEDICINE
Payer: MEDICARE

## 2023-07-15 VITALS
HEART RATE: 68 BPM | SYSTOLIC BLOOD PRESSURE: 148 MMHG | TEMPERATURE: 98 F | RESPIRATION RATE: 17 BRPM | BODY MASS INDEX: 36.9 KG/M2 | OXYGEN SATURATION: 95 % | WEIGHT: 215 LBS | DIASTOLIC BLOOD PRESSURE: 93 MMHG

## 2023-07-15 DIAGNOSIS — B97.89 VIRAL STOMATITIS: Primary | ICD-10-CM

## 2023-07-15 DIAGNOSIS — K12.1 VIRAL STOMATITIS: Primary | ICD-10-CM

## 2023-07-15 DIAGNOSIS — I10 HYPERTENSION, UNSPECIFIED TYPE: ICD-10-CM

## 2023-07-15 DIAGNOSIS — U07.1 COVID-19: ICD-10-CM

## 2023-07-15 PROCEDURE — 99282 EMERGENCY DEPT VISIT SF MDM: CPT

## 2023-07-15 NOTE — ED PROVIDER NOTES
Encounter Date: 7/15/2023       History     Chief Complaint   Patient presents with    Sore Throat     Covid +     72-year-old female who has a history of hypertension, diverticular disease and hepatitis A, presents with a history she is been COVID-19 positive for 1 week.  Within last 3 days the patient has noted some mouth sores and was concerned that was related to COVID and may worsened.  No history any fever.  She is had no airway difficulty.  She is still able to handle her own secretions as well as eat and drink.  She has been using Orajel topically.  Patient admits that she would had her COVID vaccine but no boosters.  Additionally she is not been taking her blood pressure medication within last several days.  No complaint of shortness of breath.  No chest pain.    Review of patient's allergies indicates:   Allergen Reactions    Iodine Anaphylaxis and Itching    Corticosteroids (glucocorticoids)     Meperidine Other (See Comments)    Oxycodone Itching    Unable to assess      aspartane     Past Medical History:   Diagnosis Date    Brain lesion 2011    Benign - no Tx    Diverticulitis     Genital herpes     Hepatitis A     Hyperlipidemia      Past Surgical History:   Procedure Laterality Date    ABDOMINAL SURGERY      tummy tuck    BREAST BIOPSY      eye lid surgery      Bilateral    HAND SURGERY      HYSTERECTOMY      KNEE SURGERY      NECK SURGERY      SHOULDER SURGERY      TONSILLECTOMY       No family history on file.  Social History     Tobacco Use    Smoking status: Never    Smokeless tobacco: Never   Substance Use Topics    Alcohol use: Yes     Comment: occasionally    Drug use: No     Review of Systems   Constitutional:  Negative for appetite change, chills, diaphoresis and fever.   HENT:  Positive for mouth sores and sore throat. Negative for trouble swallowing.    Respiratory:  Negative for shortness of breath.    Cardiovascular:  Negative for chest pain.   Gastrointestinal:  Negative for abdominal  pain, nausea and vomiting.   Skin:  Negative for rash.   All other systems reviewed and are negative.    Physical Exam     Initial Vitals [07/15/23 0620]   BP Pulse Resp Temp SpO2   (!) 157/109 79 17 98.1 °F (36.7 °C) 98 %      MAP       --         Physical Exam    Vitals reviewed.  Constitutional: She appears well-developed and well-nourished. She is not diaphoretic. No distress.   HENT:   Head: Normocephalic and atraumatic.   Nose: Nose normal.   Mouth/Throat: Oropharynx is clear and moist. No oropharyngeal exudate.   Mouth has a few erythematous superficial ulcers, 1 at the mucosal surface of the right corner of the mouth and another in the right oropharynx.  There is no evidence of any tonsillar swelling or erythema.  Uvula midline.  No exudate.  Tongue has no lesions.   Eyes: Conjunctivae are normal. Pupils are equal, round, and reactive to light.   Neck: Neck supple. No JVD present.   Normal range of motion.  Cardiovascular:  Normal rate, regular rhythm, normal heart sounds and intact distal pulses.     Exam reveals no gallop and no friction rub.       No murmur heard.  Pulmonary/Chest: Breath sounds normal. No respiratory distress. She has no wheezes. She has no rhonchi. She has no rales.   Abdominal: Abdomen is soft. Bowel sounds are normal. She exhibits no distension. There is no abdominal tenderness.   Musculoskeletal:         General: No tenderness or edema. Normal range of motion.      Cervical back: Normal range of motion and neck supple.     Lymphadenopathy:     She has no cervical adenopathy.   Neurological: She is alert and oriented to person, place, and time. She has normal strength. GCS score is 15. GCS eye subscore is 4. GCS verbal subscore is 5. GCS motor subscore is 6.   Skin: Skin is warm and dry. Capillary refill takes less than 2 seconds. No rash noted. No erythema. No pallor.   Psychiatric: She has a normal mood and affect. Her behavior is normal. Judgment and thought content normal.        ED Course   Procedures  Labs Reviewed - No data to display       Imaging Results    None          Medications - No data to display             Attending Attestation:             Attending ED Notes:   This 72-year-old female who has a history of hypertension recently COVID positive, presented with complaints of having mouth sores.  Findings are consistent with a viral stomatitis.  She is advised this is to be treated symptomatically but antibiotics would be of no benefit.  He is also advised that within the next couple days repeat a home COVID test and if negative resume normal activity.  She is instructed to take her antihypertensive medication daily without fail.                 Clinical Impression:   Final diagnoses:  [K12.1, B97.89] Viral stomatitis (Primary)  [U07.1] COVID-19  [I10] Hypertension, unspecified type        ED Disposition Condition    Discharge Stable          ED Prescriptions    None       Follow-up Information       Follow up With Specialties Details Why Contact Info    Seema Cueva MD Family Medicine  As needed [address]  [address]  [facility] [address]  [phone]               Frank Moses Jr., MD  07/15/23 0653

## 2023-07-15 NOTE — DISCHARGE INSTRUCTIONS
Encourage oral fluids.  Repeat your home COVID test within the next day or so and if negative you may resume normal activity.  Your mouth lesions or usually viral in cause.  Continue all present medications and ensure that you take your medicine for high blood pressure daily.  Follow up with the primary care provider return to the ER if needed.

## 2024-05-29 ENCOUNTER — TELEPHONE (OUTPATIENT)
Dept: SURGERY | Facility: CLINIC | Age: 74
End: 2024-05-29
Payer: MEDICARE

## 2024-05-29 NOTE — TELEPHONE ENCOUNTER
Returned patient's call -- referral from Dr Faith's office should be coming through. Will send a message to Desiree to keep an eye out for referral and will follow up with patient once it has been received.      ----- Message from Lizeth Neumann sent at 5/29/2024  2:51 PM CDT -----  Regarding: pt advice; appt access  Contact: pt @ [phone]  Pt is calling to be advise if referral was received on her behalf to be scheduled for prolapse bladder and rectum; hiatal hernia on right side of belly button. Pt is wanting to be scheduled as soon as possible. Please call to advise further. Thank you for all you are doing.

## 2024-05-30 ENCOUNTER — TELEPHONE (OUTPATIENT)
Dept: SURGERY | Facility: CLINIC | Age: 74
End: 2024-05-30
Payer: MEDICARE

## 2024-05-30 DIAGNOSIS — K62.3 RECTAL PROLAPSE: Primary | ICD-10-CM

## 2024-05-30 DIAGNOSIS — K46.9 ABDOMINAL HERNIA WITHOUT OBSTRUCTION AND WITHOUT GANGRENE, RECURRENCE NOT SPECIFIED, UNSPECIFIED HERNIA TYPE: ICD-10-CM

## 2024-05-30 NOTE — TELEPHONE ENCOUNTER
Spoke to patient about scheduling her appt. With Colorectal surg., but she really wanted to be seen sooner. As of now, its July 10th in [place].  I told her that I would send a message to both of the nurses, to see if they can get her in.

## 2024-06-06 ENCOUNTER — TELEPHONE (OUTPATIENT)
Dept: SURGERY | Facility: CLINIC | Age: 74
End: 2024-06-06
Payer: MEDICARE

## 2024-06-06 NOTE — TELEPHONE ENCOUNTER
Pt called in stating she is having a lot of pain and pressure and would like to be seen sooner than July 10th . Message sent to staff. ----- Message from Melody Sharpe MA sent at 6/5/2024  4:27 PM CDT -----    ----- Message -----  From: Sean Caruso LPN  Sent: 6/5/2024   4:21 PM CDT  To: Melody Sharpe MA      ----- Message -----  From: Radha Isaacs  Sent: 6/5/2024   4:19 PM CDT  To: [facility] Navigation Outpatient    Type: Needs Medical Advice  Who Called:  Patient   Symptoms (please be specific):    How long has patient had these symptoms:    Pharmacy name and phone #:    Best Call Back Number: [phone]  Additional Information: Patient is requesting a call back from Melody regarding a sooner appt. with .

## 2024-07-10 ENCOUNTER — OFFICE VISIT (OUTPATIENT)
Dept: SURGERY | Facility: CLINIC | Age: 74
End: 2024-07-10
Payer: MEDICARE

## 2024-07-10 VITALS
WEIGHT: 211.56 LBS | HEIGHT: 64 IN | BODY MASS INDEX: 36.12 KG/M2 | DIASTOLIC BLOOD PRESSURE: 79 MMHG | HEART RATE: 69 BPM | SYSTOLIC BLOOD PRESSURE: 149 MMHG | RESPIRATION RATE: 16 BRPM

## 2024-07-10 DIAGNOSIS — K59.04 CHRONIC IDIOPATHIC CONSTIPATION: Primary | ICD-10-CM

## 2024-07-10 DIAGNOSIS — N81.6 RECTOCELE: ICD-10-CM

## 2024-07-10 DIAGNOSIS — K46.9 ABDOMINAL HERNIA WITHOUT OBSTRUCTION AND WITHOUT GANGRENE, RECURRENCE NOT SPECIFIED, UNSPECIFIED HERNIA TYPE: ICD-10-CM

## 2024-07-10 PROCEDURE — 1159F MED LIST DOCD IN RCRD: CPT | Mod: CPTII,S$GLB,, | Performed by: COLON & RECTAL SURGERY

## 2024-07-10 PROCEDURE — 99999 PR PBB SHADOW E&M-EST. PATIENT-LVL IV: CPT | Mod: PBBFAC,,, | Performed by: COLON & RECTAL SURGERY

## 2024-07-10 PROCEDURE — 4010F ACE/ARB THERAPY RXD/TAKEN: CPT | Mod: CPTII,S$GLB,, | Performed by: COLON & RECTAL SURGERY

## 2024-07-10 PROCEDURE — 3008F BODY MASS INDEX DOCD: CPT | Mod: CPTII,S$GLB,, | Performed by: COLON & RECTAL SURGERY

## 2024-07-10 PROCEDURE — 3078F DIAST BP <80 MM HG: CPT | Mod: CPTII,S$GLB,, | Performed by: COLON & RECTAL SURGERY

## 2024-07-10 PROCEDURE — 1101F PT FALLS ASSESS-DOCD LE1/YR: CPT | Mod: CPTII,S$GLB,, | Performed by: COLON & RECTAL SURGERY

## 2024-07-10 PROCEDURE — 1125F AMNT PAIN NOTED PAIN PRSNT: CPT | Mod: CPTII,S$GLB,, | Performed by: COLON & RECTAL SURGERY

## 2024-07-10 PROCEDURE — 3077F SYST BP >= 140 MM HG: CPT | Mod: CPTII,S$GLB,, | Performed by: COLON & RECTAL SURGERY

## 2024-07-10 PROCEDURE — 3288F FALL RISK ASSESSMENT DOCD: CPT | Mod: CPTII,S$GLB,, | Performed by: COLON & RECTAL SURGERY

## 2024-07-10 PROCEDURE — 99204 OFFICE O/P NEW MOD 45 MIN: CPT | Mod: S$GLB,,, | Performed by: COLON & RECTAL SURGERY

## 2024-07-10 RX ORDER — HYDROCHLOROTHIAZIDE 12.5 MG/1
CAPSULE ORAL
COMMUNITY
Start: 2024-01-25 | End: 2025-01-24

## 2024-07-10 RX ORDER — LOSARTAN POTASSIUM 50 MG/1
50 TABLET ORAL DAILY
COMMUNITY

## 2024-07-10 NOTE — PROGRESS NOTES
"HPI:  Carleen Anthony is a 73 y.o. female with history of hypertension, previous sigmoid colectomy for diverticulitis who was referred to our clinic for evaluation of rectal prolapse. The patient was seen in her OBGYN clinic where she was informed she had both bladder and rectal prolapse.     In speaking with the patient today in clinic, she describes a two-month history of "change" in bowel habit, where she states the has increased urgency and frequent tenesmus. She denies any protruding tissue from her rectum, any rectal bleeding, or any circumstances where she needs to apply pressure through her vagina in order to evacuate stool. She denies any fecal incontinence episodes. She does have a remote history of hemorrhoids and underwent a hemorrhoidectomy in 5/2023. She is up to date with her colonoscopy, last being in 2022.       Past Medical History:   Diagnosis Date    Brain lesion 2011    Benign - no Tx    Diverticulitis     Genital herpes     Hepatitis A     Hyperlipidemia         Past Surgical History:   Procedure Laterality Date    ABDOMINAL SURGERY      tummy tuck    BREAST BIOPSY      eye lid surgery      Bilateral    HAND SURGERY      HYSTERECTOMY      KNEE SURGERY      NECK SURGERY      SHOULDER SURGERY      TONSILLECTOMY         Review of patient's allergies indicates:   Allergen Reactions    Iodine Anaphylaxis and Itching    Corticosteroids (glucocorticoids)     Meperidine Other (See Comments)    Oxycodone Itching    Unable to assess      aspartane       No family history on file.    Social History     Socioeconomic History    Marital status:    Tobacco Use    Smoking status: Never    Smokeless tobacco: Never   Substance and Sexual Activity    Alcohol use: Yes     Comment: occasionally    Drug use: No       ROS:  GENERAL: No fever, chills, fatigability or weight loss.  Integument: No rashes, redness, icterus  CHEST: Denies SPENCE, cyanosis, wheezing, cough and sputum production.  CARDIOVASCULAR: " "Denies chest pain, PND, orthopnea or reduced exercise tolerance.  GI: Denies abd pain, dysphagia, nausea, vomiting, no hematemesis   : Denies burning on urination, no hematuria, no bacteriuria  MSK: No deformities, swelling, joint pain swelling  Neurologic: No HAs, seizures, weakness, paresthesias, gait problems    PE:  General appearance: healthy, no distress  BP (!) 149/79 (BP Location: Right arm, Patient Position: Sitting, BP Method: X-Large (Automatic))   Pulse 69   Resp 16   Ht 5' 4" (1.626 m)   Wt 95.9 kg (211 lb 8.5 oz)   BMI 36.31 kg/m²   Sclera/ Skin non-icteric  AT NC EOMI  Neck supple trachea midline   Chest symmetric, nl excursion, no retractions, breathing comfortably  Abdomen  ND soft NT.  No masses, no organomegaly  EXT - no CCE  Neuro:  Mood/ affect nl, alert and oriented x 3, moves all ext's, gait nl    Rectal    Inspection: normal appearing rectum; no excoriation or appreciable abnormalities  JUSTIN: no palpable masses; normal resting tone; there is a small rectocele appreciated on exam      Assessment:  No evidence of rectal prolapse  Constipation  Small asymptomatic rectocele    Plan:  And port and the patient that there were symptoms or signs to suggest rectal prolapse.  Her exam confirms this.    She has constipation which we recommended that she eat a high-fiber diet and that she take a fiber supplement.  Her main symptoms are related to urinary urgency and incomplete urination.  She has an appointment with urogynecology.  "

## 2024-07-16 PROBLEM — N81.6 RECTOCELE: Status: ACTIVE | Noted: 2024-07-16

## 2024-07-16 PROBLEM — K59.04 CHRONIC IDIOPATHIC CONSTIPATION: Status: ACTIVE | Noted: 2024-07-16

## 2024-08-09 ENCOUNTER — TELEPHONE (OUTPATIENT)
Dept: UROGYNECOLOGY | Facility: CLINIC | Age: 74
End: 2024-08-09
Payer: MEDICARE

## 2024-08-12 ENCOUNTER — OFFICE VISIT (OUTPATIENT)
Dept: UROGYNECOLOGY | Facility: CLINIC | Age: 74
End: 2024-08-12
Payer: MEDICARE

## 2024-08-12 ENCOUNTER — TELEPHONE (OUTPATIENT)
Dept: UROGYNECOLOGY | Facility: CLINIC | Age: 74
End: 2024-08-12
Payer: MEDICARE

## 2024-08-12 VITALS
HEART RATE: 72 BPM | SYSTOLIC BLOOD PRESSURE: 140 MMHG | HEIGHT: 64 IN | DIASTOLIC BLOOD PRESSURE: 92 MMHG | BODY MASS INDEX: 36.81 KG/M2 | WEIGHT: 215.63 LBS

## 2024-08-12 DIAGNOSIS — G93.89 BRAIN MASS: Primary | ICD-10-CM

## 2024-08-12 DIAGNOSIS — K57.90 DIVERTICULOSIS: ICD-10-CM

## 2024-08-12 DIAGNOSIS — N39.46 URINARY INCONTINENCE, MIXED: ICD-10-CM

## 2024-08-12 DIAGNOSIS — N95.2 VAGINAL ATROPHY: ICD-10-CM

## 2024-08-12 DIAGNOSIS — R39.15 URINARY URGENCY: ICD-10-CM

## 2024-08-12 DIAGNOSIS — K59.09 CHRONIC CONSTIPATION: ICD-10-CM

## 2024-08-12 DIAGNOSIS — N81.11 CYSTOCELE, MIDLINE: ICD-10-CM

## 2024-08-12 DIAGNOSIS — N81.6 RECTOCELE, FEMALE: ICD-10-CM

## 2024-08-12 DIAGNOSIS — M79.18 MYALGIA OF PELVIC FLOOR: ICD-10-CM

## 2024-08-12 DIAGNOSIS — K42.9 UMBILICAL HERNIA WITHOUT OBSTRUCTION AND WITHOUT GANGRENE: ICD-10-CM

## 2024-08-12 PROCEDURE — 3080F DIAST BP >= 90 MM HG: CPT | Mod: CPTII,S$GLB,, | Performed by: OBSTETRICS & GYNECOLOGY

## 2024-08-12 PROCEDURE — 3288F FALL RISK ASSESSMENT DOCD: CPT | Mod: CPTII,S$GLB,, | Performed by: OBSTETRICS & GYNECOLOGY

## 2024-08-12 PROCEDURE — 4010F ACE/ARB THERAPY RXD/TAKEN: CPT | Mod: CPTII,S$GLB,, | Performed by: OBSTETRICS & GYNECOLOGY

## 2024-08-12 PROCEDURE — 3008F BODY MASS INDEX DOCD: CPT | Mod: CPTII,S$GLB,, | Performed by: OBSTETRICS & GYNECOLOGY

## 2024-08-12 PROCEDURE — 1159F MED LIST DOCD IN RCRD: CPT | Mod: CPTII,S$GLB,, | Performed by: OBSTETRICS & GYNECOLOGY

## 2024-08-12 PROCEDURE — 3077F SYST BP >= 140 MM HG: CPT | Mod: CPTII,S$GLB,, | Performed by: OBSTETRICS & GYNECOLOGY

## 2024-08-12 PROCEDURE — 1160F RVW MEDS BY RX/DR IN RCRD: CPT | Mod: CPTII,S$GLB,, | Performed by: OBSTETRICS & GYNECOLOGY

## 2024-08-12 PROCEDURE — 99205 OFFICE O/P NEW HI 60 MIN: CPT | Mod: 25,S$GLB,, | Performed by: OBSTETRICS & GYNECOLOGY

## 2024-08-12 PROCEDURE — 1126F AMNT PAIN NOTED NONE PRSNT: CPT | Mod: CPTII,S$GLB,, | Performed by: OBSTETRICS & GYNECOLOGY

## 2024-08-12 PROCEDURE — 87086 URINE CULTURE/COLONY COUNT: CPT | Performed by: OBSTETRICS & GYNECOLOGY

## 2024-08-12 PROCEDURE — 99999 PR PBB SHADOW E&M-EST. PATIENT-LVL V: CPT | Mod: PBBFAC,,, | Performed by: OBSTETRICS & GYNECOLOGY

## 2024-08-12 PROCEDURE — 1101F PT FALLS ASSESS-DOCD LE1/YR: CPT | Mod: CPTII,S$GLB,, | Performed by: OBSTETRICS & GYNECOLOGY

## 2024-08-12 PROCEDURE — 51701 INSERT BLADDER CATHETER: CPT | Mod: S$GLB,,, | Performed by: OBSTETRICS & GYNECOLOGY

## 2024-08-12 RX ORDER — ESTRADIOL 0.1 MG/G
CREAM VAGINAL
Qty: 42.5 G | Refills: 11 | Status: SHIPPED | OUTPATIENT
Start: 2024-08-12 | End: 2024-08-12

## 2024-08-12 RX ORDER — ESTRADIOL 0.1 MG/G
CREAM VAGINAL
Qty: 42.5 G | Refills: 11 | Status: SHIPPED | OUTPATIENT
Start: 2024-08-12 | End: 2024-08-12 | Stop reason: SDUPTHER

## 2024-08-12 RX ORDER — FLAXSEED OIL 1000 MG
20 CAPSULE ORAL
COMMUNITY

## 2024-08-12 NOTE — TELEPHONE ENCOUNTER
----- Message from Brigido Flores sent at 8/12/2024  4:14 PM CDT -----  Name of Who is Calling:VIOLETA ALBRIGHT [[id number]]         What is the request in detail: Pt calling because he medication has to be faxed over from the doctor office for her estradioL (ESTRACE) 0.01 % (0.1 mg/gram) vaginal cream.Please call back to further assist.     Pt states she didn't know that the paper had to be faxed over and wanted to say she is [address] [place] [address]  Main: [phone]      Can the clinic reply by [facility]: No                What Number to Call Back if not in [facility]: [phone]

## 2024-08-12 NOTE — PROGRESS NOTES
"[facility] - UROGYNECOLOGY  [address] [address]    Carleen Anthony  [id number]  1950  August 14, 2024    Consulting Physician: Mariia Perrin NP   GYN: Dr. Eden Romano ([place], [place])  Primary M.D.: Seema Cueva MD    Chief Complaint   Patient presents with    Cystocele   --mostly bothered by strong urgency    HPI:     1)  UI:  (+) ZI (rare) = (+) UUI (also PV dribbling; mostly at night if waits too long).  (--) pads.  Daytime frequency: Q 1 hours.  Nocturia: Yes: 3/night.   (--) dysuria,  (--) hematuria,  (--) frequent UTIs.  (+) complete bladder emptying.    2)  POP:  Present. To introitus--was told. Doesn't feel anything.   Symptoms:(+)  pressure.  (--) vaginal bleeding. (--) vaginal discharge. (--) sexually active.  (+) dyspareunia--h/o;  has some ED.   (+)  Vaginal dryness. Wipes a lot due to PV dribbling. Uses vagisil PRN.  (--) vaginal estrogen use.     3)  BM:  (+) constipation/straining, intermittent--ok as long as she takes metamucil.  (--) chronic diarrhea. (--) hematochezia.  (--) fecal incontinence.  (--) fecal smearing/urgency.  (+) complete evacuation--as long as taking fiber.  Using squatty potty.   --GYN was worried about rectal prolapse  --patient does not notice rectal bulge  --saw MARCI Trinidad) 7/2024:  In speaking with the patient today in clinic, she describes a two-month history of "change" in bowel habit, where she states the has increased urgency and frequent tenesmus. She denies any protruding tissue from her rectum, any rectal bleeding, or any circumstances where she needs to apply pressure through her vagina in order to evacuate stool. She denies any fecal incontinence episodes. She does have a remote history of hemorrhoids and underwent a hemorrhoidectomy in 5/2023. She is up to date with her colonoscopy, last being in 2022.   --r/o rectal prolapse: none noted    Inspection: normal appearing rectum; no excoriation or " appreciable abnormalities  JUSTIN: no palpable masses; normal resting tone; there is a small rectocele appreciated on exam  --h/o anal fissure--resolved    4)  Umbilical hernia:  --feels bulge at umbilicus  --would like repaired  --bothers because feels like makes stomach distended  --no pain at rest, N/V/F/C    Past Medical History  Past Medical History:   Diagnosis Date    Brain lesion     Benign - no Tx    Diverticulitis     Genital herpes     Hepatitis A     Hyperlipidemia    --brain lesion: hasn't had MRI since 2012 was seeing neuro--then,follow up was stopped years ago   --was having blurry visit--attributes to ptosis--had blepharoplasty with relief   --no new HA, dizziness, N/V/F/C, has some mild UE numbness at hands from CTS; no new neuro deficits/numbness; no new issues with speech/memory   --is having some mild blurry visit R eye--followed by Ophtho   --has never seen NSG  --last MR brain :  Abnormal MRI scan of the brain with and without contrast   demonstratin. Right mid brain lesion that is lateral to the third cranial nerve   nuclei in the dorsolateral cerebral peduncle with the above described   dimensions. Differential diagnoses inlude hamartoma, less likely   low-grade glioma.  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   2.  In comparison to the patient's previous study dated 3/18/2011,   there has been no significant interval change; also supportive of   hamartoma.   3. There is a linear signal change in the left cerebellaum most consistent   with a prominent vascular structure and less likely a developmental venous   anomaly.      Past Surgical History  Past Surgical History:   Procedure Laterality Date    ABDOMINAL SURGERY      tummy tuck    BREAST BIOPSY      eye lid surgery      Bilateral    HAND SURGERY      HYSTERECTOMY      KNEE SURGERY      NECK SURGERY      SHOULDER SURGERY      TONSILLECTOMY     CTS B (hand surgery); 2R, 3L  2014 ([place])  Laparoscopic-assisted sigmoid colectomy for diverticular disease    During 2nd pregnancy: xlap/USO for ovarian dermoid   ([facility], gen surg, [place]) for rectal bleedin. Fissurectomy  2. Internal/external hemorr hemorrhoidectomy, 1 column  3. Excision perianal skin tag     Hysterectomy: Yes   Date: .  Indication: menorrhagia, endometriosis   Type: xlap/Pfannenstiel (probably)  Cervix present: No  Ovaries present: No  Other procedures at time of hysterectomy:  none    Past Ob History     x 2.  C/s x 0.    Largest infant weight: 7#11oz  yes FAVD. yes episiotomy.      Gynecologic History  LMP: No LMP recorded. Patient has had a hysterectomy.  Age of menarche: 15 yo  Age of menopause: with hyst  Menstrual history: h/o menorrhagia/endometriosis  Pap test: post hyst.  History of abnormal paps: No.  History of STIs:  No  Mammogram: Date of last: .  Result: Normal  Colonoscopy: Date of last:  (sigmoidoscopy).  colonoscopy, diverticulosis  Result:  normal per report.  Re[facility] due:   [place] [place]  DEXA:  Date of last:  Result:normal per report  Repeat due:  per PCP.     Family History  No family history on file.   Colon CA: No  Breast CA: No  GYN CA: Yes - ovarian (82 yo), MA (ovarian 89 yo)   CA: no    Social History  Social History     Tobacco Use   Smoking Status Never   Smokeless Tobacco Never     Social History     Substance and Sexual Activity   Alcohol Use Yes    Comment: occasionally   .    Social History     Substance and Sexual Activity   Drug Use No   .  The patient is .  Resides in [place] [address].  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      Allergies  Review of patient's allergies indicates:   Allergen Reactions    Iodine Anaphylaxis and Itching    Corticosteroids (glucocorticoids)     Meperidine Other (See Comments)    Oxycodone Itching    Unable to assess      aspartane        Medications  Current Outpatient Medications on File Prior to Visit   Medication Sig Dispense Refill    aspirin (ECOTRIN) 81 MG EC tablet Take 1 tablet by mouth every morning.      calcium carbonate-mag oxide (SUPER BRAYAN-MAG) 333-167 mg Tab Super C      cyanocobalamin (VITAMIN B-12) 100 MCG tablet Take 500 mcg by mouth.      ibuprofen (ADVIL,MOTRIN) 800 MG tablet Take 1 tablet by mouth.      Lactobacillus rhamnosus GG (CULTURELLE) 10 billion cell capsule Take 1 capsule by mouth once daily.      losartan (COZAAR) 50 MG tablet Take 50 mg by mouth once daily.      multivitamin with iron (DAILY MULTIPLE VITAMINS/IRON ORAL) Take 2 tablets by mouth.      zinc sulfate (ZINC-15) 66 mg Tab zinc      Ca carb-magnesium-caps-deondre 653--50 mg Tab Take by mouth. (Patient not taking: Reported on 8/12/2024)      COD LIVER OIL ORAL Take by mouth.      diphenhydrAMINE (BENADRYL) 25 mg capsule Benadryl 25 mg capsule   Take 1 capsule every 4 hours by oral route. (Patient not taking: Reported on 7/10/2024)      hydroCHLOROthiazide (MICROZIDE) 12.5 mg capsule Take orange juice or banana.Take with food/milk.Avoid exposure to sun.Take or use exactly as directed. (Patient not taking: Reported on 8/12/2024)      lutein 10 mg Tab Take 20 mg by mouth.      multivitamin with minerals tablet Take 1 tablet by mouth once daily. (Patient not taking: Reported on 8/12/2024)       No current facility-administered medications on file prior to visit.       Review of Systems A 14 point ROS was reviewed with pertinent positives as noted above in the history of present illness.      Constitutional: negative  Eyes: negative  Endocrine: negative  Gastrointestinal: negative  Cardiovascular: negative  Respiratory: negative  Allergic/Immunologic: negative  Integumentary: negative  Psychiatric: negative  Musculoskeletal: negative   Ear/Nose/Throat: negative  Neurologic: negative  Genitourinary: SEE HPI  Hematologic/Lymphatic: negative   Breast:  "negative    Urogynecologic Exam  BP (!) 140/92   Pulse 72   Ht 5' 4" (1.626 m)   Wt 97.8 kg (215 lb 9.8 oz)   BMI 37.01 kg/m²     GENERAL APPEARANCE:  The patient is well-developed, well-nourished.   Neck:  Supple with no thyromegaly, no carotid bruits.  Heart:  Regular rate and rhythm, no murmurs, rubs or gallops.  Lungs:  Clear.  No CVA tenderness.  Abdomen:  Soft, nontender, nondistended, no hepatosplenomegaly. R-sided umb hernia (5L x 3 W)--reducible, NT.   Incisions:  vert midline + Abd plasty well-healed.     PELVIC:    External genitalia:  Normal Bartholins, Skenes and labia bilaterally.    Urethra:  No caruncle, diverticulum or masses.  (+) hypermobility.    Vagina:  Atrophy (+) , no bladder masses or tender, no discharge.  +mild TTP B LV.    Cervix:  absent  Uterus: uterus absent  Adnexa: Not palpable.    POP-Q:  Aa -1; Ba -1; C -9; Ap -1; Bp -1.  Genital hiatus 3, perineal body 3, total vaginal length 9-10 (standing).    NEUROLOGIC:  Cranial nerves 2 through 12 intact.  Strength 5/5.  DTRs 2+ lower extremities.  S2 through 4 normal.  Sacral reflexes intact.    EXT: WILKS, 2+ pulses bilaterally, no C/C/E    COUGH STRESS TEST:  negative  KEGEL: 1 /5    RECTAL:    External:  Normal, (--) hemorrhoids, (--) dovetailing.   Internal:   deferred    PVR: 30 mL    Impression    1. Brain mass    2. Urinary incontinence, mixed    3. Vaginal atrophy    4. Urinary urgency    5. Myalgia of pelvic floor    6. Diverticulosis    7. Cystocele, midline    8. Rectocele, female    9. Chronic constipation    10. Umbilical hernia without obstruction and without gangrene        Initial Plan  The patient was counseled regarding these issues. The patient was given a summary sheet containing each of these issues with possible options for evaluation and management. When appropriate, we also reviewed computer-generated diagrams specific to their diagnoses..  All questions were addressed to the patient's satisfaction.    1)  Mixed " urinary incontinence, urge > stress:    --urine C&S  --mostly bothered by strong urge to urinate   --not likely from slight movement of vaginal walls   --more likely overactive bladder and pelvic floor muscle dysfunction  --Empty bladder every 3 hours.  Empty well: wait a minute, lean forward on toilet.    --Avoid dietary irritants (see sheet).  Keep diary x 3-5 days to determine your irritants.   --watch caffeine  --start pelvic floor PT.  First, relax muscles, then strengthen.  Call to make to make appt:   LAURIE De La Cruzll: [address]  (p) [phone].  --URGE: consider medication in future. Takes 2-4 weeks to see if will have effect.  For dry mouth: get sour, sugar free lozenge or gum.    --STRESS:  Pessary vs. Sling.     2)  Stage 1-2 cystocele/rectocele, mild:  --discussed  --be aware of this  --treat what think may be causing urgency/pressure (see above)    3)  Vaginal atrophy (dryness):    --start Vaginal estrogen:  --Use 0.5 grams of estrogen cream in vagina with applicator or dime-sized amount with finger (as far as can reach internally) nightly x 2 weeks, then twice a week thereafter.  You can also apply a dime-sized amount with your finger around the vaginal opening and inner lips at same frequency.     --Vaginal estrogen may help to decrease pain related to dryness with intercourse and urinary symptoms (urgency/frequency/UTIs) around menopause.     --watch excessive wiping--don't use over-drying products (hypoallergenic wipes)    4)  Constipation:  --hydrate well  Controlling constipation may help bladder urgency/leakage and fiber may better control cholesterol and blood glucose.  Start daily fiber.  Take 1 tsp of fiber powder (psyllium or other sugar-free powder).  Mix in 8 oz of water.  Take x 3-5 days.  Then, increase fiber by 1 tsp every 3-5 days until stool is easy to pass.  Stop and continue at that dose.   Do not exceed 6 tsps/day.  May also use over the counter stool  softener 1-2 x/day.  AVOID laxatives.  --citrucel fine--on [facility] site    5)  Brain mass:  --repeat MRI brain  --may need reconsult neuro or NSG    6)  R-sided umbilical hernia:  --discussed possible symptoms  --will not cause bloating/gas   --keep food/stool diary x 2-3 weeks; if ID triggers, cut back   --optimize fiber; citrucel or methylcellose is less gas-forming   --continue probiotics  --CTM for now    7)  RTC 3-4 months. VV ok.     Approximately 60 min were spent in consult, 90 % in discussion.     Thank you for requesting consultation of your patient.  I look forward to participating in their care.    Ramirez Ballard  Female Pelvic Medicine and Reconstructive Surgery  [facility] [place], [place]

## 2024-08-12 NOTE — PATIENT INSTRUCTIONS
Bladder Irritants  Certain foods and drinks have been associated with worsening symptoms of urinary frequency, urgency, urge incontinence, or bladder pain. If you suffer from any of these conditions, you may wish to try eliminating one or more of these foods from your diet and see if your symptoms improve. If bladder symptoms are related to dietary factors, strict adherence to a diet thateliminates the food should bring marked relief in 10 days. Once you are feeling better, you can begin to add foods back into your diet, one at a time. If symptoms return, you will be able to identify the irritant. As you add foods back to your diet it is very important that you drink significant amounts of water.    -----------------------------------------------------------------------------------------------  List of Common Bladder Irritants*  Alcoholic beverages  Apples and apple juice  Cantaloupe  Carbonated beverages  Chili and spicy foods  Chocolate  Citrus fruit  Coffee (including decaffeinated)  Cranberries and cranberry juice  Grapes  Guava  Milk Products: milk, cheese, cottage cheese, yogurt, ice cream  Peaches  Pineapple  Plums  Strawberries  Sugar especially artificial sweeteners, saccharin, aspartame, corn sweeteners, honey, fructose, sucrose, lactose  Tea  Tomatoes and tomato juice  Vitamin B complex  Vinegar  *Most people are not sensitive to ALL of these products; your goal is to find the foods that make YOUR symptoms worse.  ---------------------------------------------------------------------------------------------------    Low-acid fruit substitutions include apricots, papaya, pears and watermelon. Coffee drinkers can drink Kava or other lowacid instant drinks. Tea drinkers can substitute non-citrus herbal and sun brewed teas. Calcium carbonate co-buffered with calcium ascorbate can be substituted for Vitamin C. Prelief is a dietary supplement that works as an acid blocker for the bladder.    Where to get more  information:        Overcoming Bladder Disorders by Gardenia Caballero and Steffany Strickland, 1990        You Dont Have to Live with Cystitis! By Jonna Dos Santos, 1988  [email or web address]://[email or web address].org/oab  ---------------------------------------------------    1)  Mixed urinary incontinence, urge > stress:    --urine C&S  --mostly bothered by strong urge to urinate   --not likely from slight movement of vaginal walls   --more likely overactive bladder and pelvic floor muscle dysfunction  --Empty bladder every 3 hours.  Empty well: wait a minute, lean forward on toilet.    --Avoid dietary irritants (see sheet).  Keep diary x 3-5 days to determine your irritants.   --watch caffeine  --start pelvic floor PT.  First, relax muscles, then strengthen.  Call to make to make appt:   LAURIE Quinteros: [address]  (p) [phone].  --URGE: consider medication in future. Takes 2-4 weeks to see if will have effect.  For dry mouth: get sour, sugar free lozenge or gum.    --STRESS:  Pessary vs. Sling.     2)  Stage 1-2 cystocele/rectocele, mild:  --discussed  --be aware of this  --treat what think may be causing urgency/pressure (see above)    3)  Vaginal atrophy (dryness):    --start Vaginal estrogen:  --Use 0.5 grams of estrogen cream in vagina with applicator or dime-sized amount with finger (as far as can reach internally) nightly x 2 weeks, then twice a week thereafter.  You can also apply a dime-sized amount with your finger around the vaginal opening and inner lips at same frequency.     --Vaginal estrogen may help to decrease pain related to dryness with intercourse and urinary symptoms (urgency/frequency/UTIs) around menopause.     --watch excessive wiping--don't use over-drying products (hypoallergenic wipes)    4)  Constipation:  --hydrate well  Controlling constipation may help bladder urgency/leakage and fiber may better control cholesterol and blood glucose.  Start  daily fiber.  Take 1 tsp of fiber powder (psyllium or other sugar-free powder).  Mix in 8 oz of water.  Take x 3-5 days.  Then, increase fiber by 1 tsp every 3-5 days until stool is easy to pass.  Stop and continue at that dose.   Do not exceed 6 tsps/day.  May also use over the counter stool softener 1-2 x/day.  AVOID laxatives.  --citrucel fine--[facility] site    5)  Brain mass:  --repeat MRI brain  --may need reconsult neuro or NSG    6)  R-sided umbilical hernia:  --discussed possible symptoms  --will not cause bloating/gas   --keep food/stool diary x 2-3 weeks; if ID triggers, cut back   --optimize fiber; citrucel or methylcellose is less gas-forming   --continue probiotics  --CTM for now    7)  RTC 3-4 months. VV ok.

## 2024-08-13 LAB — BACTERIA UR CULT: NO GROWTH

## 2024-08-13 RX ORDER — ESTRADIOL 0.1 MG/G
CREAM VAGINAL
Qty: 42.5 G | Refills: 11 | Status: SHIPPED | OUTPATIENT
Start: 2024-08-13

## 2024-08-14 ENCOUNTER — TELEPHONE (OUTPATIENT)
Dept: UROGYNECOLOGY | Facility: CLINIC | Age: 74
End: 2024-08-14
Payer: MEDICARE

## 2024-08-14 PROBLEM — R39.15 URINARY URGENCY: Status: ACTIVE | Noted: 2024-08-14

## 2024-08-14 PROBLEM — K59.09 CHRONIC CONSTIPATION: Status: ACTIVE | Noted: 2024-07-16

## 2024-08-14 PROBLEM — K42.9 UMBILICAL HERNIA WITHOUT OBSTRUCTION AND WITHOUT GANGRENE: Status: ACTIVE | Noted: 2024-08-14

## 2024-08-14 PROBLEM — N39.46 URINARY INCONTINENCE, MIXED: Status: ACTIVE | Noted: 2024-08-14

## 2024-08-14 PROBLEM — K57.90 DIVERTICULOSIS: Status: ACTIVE | Noted: 2024-08-14

## 2024-08-14 PROBLEM — G93.89 BRAIN MASS: Status: ACTIVE | Noted: 2024-08-14

## 2024-08-14 PROBLEM — N95.2 VAGINAL ATROPHY: Status: ACTIVE | Noted: 2024-08-14

## 2024-08-14 PROBLEM — M79.18 MYALGIA OF PELVIC FLOOR: Status: ACTIVE | Noted: 2024-08-14

## 2024-08-14 PROBLEM — N81.11 CYSTOCELE, MIDLINE: Status: ACTIVE | Noted: 2024-08-14

## 2024-08-14 NOTE — TELEPHONE ENCOUNTER
Called pt, no answer. LVM that urine culture was negative for infection.         ----- Message from Ramirez Ballard MD sent at 8/13/2024 11:09 PM CDT -----  Please let the patient know urine C&S was negative for infection.  Thanks!

## 2024-08-16 ENCOUNTER — HOSPITAL ENCOUNTER (OUTPATIENT)
Dept: RADIOLOGY | Facility: HOSPITAL | Age: 74
Discharge: HOME | End: 2024-08-16
Attending: OBSTETRICS & GYNECOLOGY
Payer: MEDICARE

## 2024-08-16 DIAGNOSIS — G93.89 BRAIN MASS: ICD-10-CM

## 2024-08-16 PROCEDURE — 70553 MRI BRAIN STEM W/O & W/DYE: CPT | Mod: 26,,, | Performed by: RADIOLOGY

## 2024-08-16 PROCEDURE — 70553 MRI BRAIN STEM W/O & W/DYE: CPT | Mod: TC

## 2024-08-16 PROCEDURE — A9585 GADOBUTROL INJECTION: HCPCS | Performed by: OBSTETRICS & GYNECOLOGY

## 2024-08-16 PROCEDURE — 25500020 PHARM REV CODE 255: Performed by: OBSTETRICS & GYNECOLOGY

## 2024-08-16 RX ORDER — GADOBUTROL 604.72 MG/ML
9 INJECTION INTRAVENOUS
Status: COMPLETED | OUTPATIENT
Start: 2024-08-16 | End: 2024-08-16

## 2024-08-16 RX ADMIN — GADOBUTROL 9 ML: 604.72 INJECTION INTRAVENOUS at 02:08

## 2024-08-18 DIAGNOSIS — D49.6 BRAIN TUMOR: Primary | ICD-10-CM

## 2024-08-19 ENCOUNTER — TELEPHONE (OUTPATIENT)
Dept: UROGYNECOLOGY | Facility: CLINIC | Age: 74
End: 2024-08-19
Payer: MEDICARE

## 2024-08-19 NOTE — TELEPHONE ENCOUNTER
Contacted patient to relay Dr. Ballard's below message. Patient verbalized understanding and denies other needs at this time. Assisted in scheduling appointment. Call ended.      ----- Message from Ramirez Ballard MD sent at 8/18/2024  6:21 PM CDT -----  Please let patient know that her brain MRI still shows the mass she had, but it looks about the same--which is good!  In order to make sure she is followed up appropriately, I would like her to see neurosurgery.  The aren't going to make her have surgery, but they can look at things and decide if she needs other testing and what her follow up plan should be so that things continue to remain ok.  Please help her make that appt. Thanks!

## 2024-08-20 DIAGNOSIS — N95.2 VAGINAL ATROPHY: ICD-10-CM

## 2024-08-20 RX ORDER — ESTRADIOL 0.1 MG/G
CREAM VAGINAL
Qty: 42.5 G | Refills: 11 | Status: SHIPPED | OUTPATIENT
Start: 2024-08-20 | End: 2024-08-20 | Stop reason: SDUPTHER

## 2024-08-20 RX ORDER — ESTRADIOL 0.1 MG/G
CREAM VAGINAL
Qty: 42.5 G | Refills: 11 | Status: SHIPPED | OUTPATIENT
Start: 2024-08-20 | End: 2024-08-22 | Stop reason: SDUPTHER

## 2024-08-20 NOTE — TELEPHONE ENCOUNTER
----- Message from Melody Mancera sent at 8/20/2024  1:59 PM CDT -----  Regarding: Rx status  Name of Who is Calling: Sarah           What is the request in detail: Patient is requesting a call back to find out if Rx for Estradol was sent over to [facility].           Can the clinic reply by [facility]: No           What Number to Call Back if not in [facility]:  [phone]

## 2024-08-22 DIAGNOSIS — N95.2 VAGINAL ATROPHY: ICD-10-CM

## 2024-08-22 RX ORDER — ESTRADIOL 0.1 MG/G
CREAM VAGINAL
Qty: 42.5 G | Refills: 11 | Status: SHIPPED | OUTPATIENT
Start: 2024-08-22

## 2024-09-10 ENCOUNTER — OFFICE VISIT (OUTPATIENT)
Dept: NEUROSURGERY | Facility: CLINIC | Age: 74
End: 2024-09-10
Payer: MEDICARE

## 2024-09-10 VITALS — HEART RATE: 67 BPM | DIASTOLIC BLOOD PRESSURE: 85 MMHG | SYSTOLIC BLOOD PRESSURE: 136 MMHG

## 2024-09-10 DIAGNOSIS — D49.6 BRAIN TUMOR: ICD-10-CM

## 2024-09-10 DIAGNOSIS — G93.9 BRAIN LESION: Primary | ICD-10-CM

## 2024-09-10 PROCEDURE — 99204 OFFICE O/P NEW MOD 45 MIN: CPT | Mod: S$GLB,,, | Performed by: NEUROLOGICAL SURGERY

## 2024-09-10 PROCEDURE — 1159F MED LIST DOCD IN RCRD: CPT | Mod: CPTII,S$GLB,, | Performed by: NEUROLOGICAL SURGERY

## 2024-09-10 PROCEDURE — 1101F PT FALLS ASSESS-DOCD LE1/YR: CPT | Mod: CPTII,S$GLB,, | Performed by: NEUROLOGICAL SURGERY

## 2024-09-10 PROCEDURE — 3288F FALL RISK ASSESSMENT DOCD: CPT | Mod: CPTII,S$GLB,, | Performed by: NEUROLOGICAL SURGERY

## 2024-09-10 PROCEDURE — 3079F DIAST BP 80-89 MM HG: CPT | Mod: CPTII,S$GLB,, | Performed by: NEUROLOGICAL SURGERY

## 2024-09-10 PROCEDURE — 99999 PR PBB SHADOW E&M-EST. PATIENT-LVL III: CPT | Mod: PBBFAC,,, | Performed by: NEUROLOGICAL SURGERY

## 2024-09-10 PROCEDURE — 4010F ACE/ARB THERAPY RXD/TAKEN: CPT | Mod: CPTII,S$GLB,, | Performed by: NEUROLOGICAL SURGERY

## 2024-09-10 PROCEDURE — 1160F RVW MEDS BY RX/DR IN RCRD: CPT | Mod: CPTII,S$GLB,, | Performed by: NEUROLOGICAL SURGERY

## 2024-09-10 PROCEDURE — 3075F SYST BP GE 130 - 139MM HG: CPT | Mod: CPTII,S$GLB,, | Performed by: NEUROLOGICAL SURGERY

## 2024-09-10 NOTE — PROGRESS NOTES
CHIEF COMPLAINT:  Brain lesion    HPI:  Carleen Anthony is a 73 y.o.  female with below PMH, who is referred for evaluation of brain lesion within right cerebral peduncle.  It was first discovered in 2012 during a workup for myasthenia gravis.  She had an LP in 1988-89 for HA workup that was normal.  She has not had one since discovery of lesion.  She is not interested in repeating at this time.    She denies any neuro deficits outside of ptosis due to MG.    Review of patient's allergies indicates:   Allergen Reactions    Iodine Anaphylaxis and Itching    Corticosteroids (glucocorticoids)     Meperidine Other (See Comments)    Oxycodone Itching    Unable to assess      aspartane       Past Medical History:   Diagnosis Date    Brain lesion 2011    Benign - no Tx    Diverticulitis     Genital herpes     Hepatitis A     Hyperlipidemia      Past Surgical History:   Procedure Laterality Date    ABDOMINAL SURGERY      tummy tuck    BREAST BIOPSY      eye lid surgery      Bilateral    HAND SURGERY      HYSTERECTOMY      KNEE SURGERY      NECK SURGERY      SHOULDER SURGERY      TONSILLECTOMY       No family history on file.  Social History     Tobacco Use    Smoking status: Never    Smokeless tobacco: Never   Substance Use Topics    Alcohol use: Yes     Comment: occasionally    Drug use: No        Review of Systems   Constitutional: Negative.    HENT:  Negative for congestion, ear discharge, ear pain, hearing loss, nosebleeds, sinus pain and tinnitus.    Eyes: Negative.    Respiratory: Negative.     Cardiovascular:  Negative for chest pain, palpitations, claudication and leg swelling.   Gastrointestinal:  Negative for abdominal pain, blood in stool, constipation, diarrhea, melena and vomiting.   Genitourinary:  Negative for flank pain, frequency and urgency.   Musculoskeletal:  Negative for falls.   Skin: Negative.    Neurological: Negative.    Endo/Heme/Allergies:  Does not bruise/bleed easily.    Psychiatric/Behavioral: Negative.         OBJECTIVE:   Vital Signs:  Pulse: 67 (09/10/24 1307)  BP: 136/85 (09/10/24 1307)    Physical Exam:  Constitutional: Patient sitting comfortably in chair. Appears well developed and well nourished.  Skin: Exposed areas are intact without abnormal markings, rashes or other lesions.  HEENT: Normocephalic. Normal conjunctivae.  Cardiovascular: Normal rate and regular rhythm.  Respiratory: Chest wall rises and falls symmetrically, without signs of respiratory distress.  Abdomen: Soft and non-tender.  Extremities: Warm and without edema. Calves supple, non-tender.  Psych/Behavior: Normal affect.    Neurological:    Mental status: Alert and oriented. Conversational and appropriate.       Cranial Nerves: VFF to confrontation. PERRL. EOMI without nystagmus. Facial STLT normal and symmetric. Strong, symmetric muscles of mastication. Facial strength full and symmetric. Hearing equal bilaterally to finger rub. Palate and uvula rise and fall normally in midline. Shoulder shrug 5/5 strength. Tongue midline.     Motor:    Upper:  Deltoids Triceps Biceps WE WF     R 5/5 5/5 5/5 5/5 5/5 5/5    L 5/5 5/5 5/5 5/5 5/5 5/5      Lower:  HF KE KF DF PF EHL    R 5/5 5/5 5/5 5/5 5/5 5/5    L 5/5 5/5 5/5 5/5 5/5 5/5     Sensory: Intact sensation to light touch in all extremities. Romberg negative.    Reflexes:          DTR: 2+ symmetrically throughout.     Perez's: Negative.     Babinski's: Negative.     Clonus: Negative.    Cerebellar: Finger-to-nose and rapid alternating movements normal.     Gait stable    Diagnostic Results:  All imaging was independently reviewed by me.    MRI brain, dated 8/16/24:  1. 5.3x3.6mm T2 hyperintensity in dorsal midbrain  2. 5x5mm in 3/19/2012 MRI    ASSESSMENT/PLAN:     Problem List Items Addressed This Visit    None  Visit Diagnoses       Brain lesion    -  Primary    Relevant Orders    MRI Brain Demyelinating W W/O Contrast    Brain tumor                 Small T2 hyperintensity within right dorsal cerebral peduncle of unclear etiology.  First discovered in 2012 during workup for myasthenia gravis and similar in size (approx 5mm) since.  Patient is asymptomatic and not interested in further workup outside surveillance MRI, which I think is reasonable since lesion has remained stable.  Biopsy would carry significant risks.    - VV in 1 yr with [facility]    The patient understands and agrees with the plan of care. All questions were answered.    Time spent on this encounter: 45 minutes. This includes face-to-face time and non-face to face time preparing to see the patient (eg, review of tests), obtaining and/or reviewing separately obtained history, documenting clinical information in the electronic or other health record, independently interpreting results and communicating results to the patient/family/caregiver, or care coordinator.          .

## 2024-09-17 ENCOUNTER — CLINICAL SUPPORT (OUTPATIENT)
Dept: REHABILITATION | Facility: HOSPITAL | Age: 74
End: 2024-09-17
Attending: OBSTETRICS & GYNECOLOGY
Payer: MEDICARE

## 2024-09-17 DIAGNOSIS — M62.89 PELVIC FLOOR DYSFUNCTION: Primary | ICD-10-CM

## 2024-09-17 DIAGNOSIS — M79.18 MYALGIA OF PELVIC FLOOR: ICD-10-CM

## 2024-09-17 DIAGNOSIS — K57.90 DIVERTICULOSIS: ICD-10-CM

## 2024-09-17 DIAGNOSIS — R39.15 URINARY URGENCY: ICD-10-CM

## 2024-09-17 PROCEDURE — 97530 THERAPEUTIC ACTIVITIES: CPT | Mod: PO

## 2024-09-17 PROCEDURE — 97161 PT EVAL LOW COMPLEX 20 MIN: CPT | Mod: PO

## 2024-09-17 NOTE — PATIENT INSTRUCTIONS
"Home Exercise Program: 09/17/2024          DOUBLE VOIDING - Double voiding is a technique that may assist the bladder to empty more effectively when  urine is left in the bladder. It involves passing  urine more than once each time that you go to  the toilet. This makes sure that the bladder is  completely empty.    Here are 3 strategies you can try to fully empty your bladder.  You do not have to do all of these things every single time. Find which ones work best for you.     Check to make sure your pelvic floor is relaxed   Do a body scan - make sure your legs, buttocks, and abdominals are relaxed.  Take a couple deep, slow breaths to encourage your pelvic floor muscles to DROP (ie. Try Diaphragmatic Breathing).  Gently apply pressure over your bladder.  Change your pelvic position: lean forward, rock your pelvis forward and backward, stand up then sit back down.     Wait at least 30 seconds to 1 minute to see if a second urine stream begins.     Do not stop your urine stream or push/strain!      Home Exercise Program: 09/17/2024    "Roll for Control"  Strategies to Delay Voiding    What to do when you feel like you "gotta go gotta go!"     Stop what you are doing.    Take a few good deep breaths (this settles down your nervous system and relaxes your bladder).    WHILE YOU CONTINUE DEEP BREATHING, activate your pelvic floor by performing several quick contractions and releases.  (Pelvic floor contractions send a message to the bladder to relax and hold urine.)  Sitting: Roll thigh in and out slowly or squeeze knees together  Standing: Roll thigh in/out slowly and gently    As you do the above, you can also count backwards from 100 (to help get your mind off the urge!).       After the urge to void has quietly, you may be able to process with your activity OR if it has been approximately 3 hours since you've voided, you may choose to go to the bathroom and void.        Remember......"Control your bladder before it " "controls YOU!"      1) Bowel function - consider the following recommendations for optimizing bowel function. Good bowel health is important for overall pelvic floor function.        Positioning and techniques for elimination     Bowel Movement Mechanics  1. Sit on the toilet comfortably with legs and buttocks relaxed.  2. Put your feet on a waste basket or squatty potty  3. Lean forward while keeping your back straight, forearms rest on knees.  4. Keep your knees apart.  5. Fully relax pelvic floor muscles - think about softening, opening, dropping  6. Use gentle belly breaths and bulge lower abdominals like making a beer belly  7. Keep belly big on exhalation  8. Exhale like blowing out birthday candles  9. Keep breathing like this through entire bowel movement  10. Make sure to give enough time, ok for it to take several minutes     Do not strain and Do not hold your breath.       (Search "Squatty Potty" on [employer])             "

## 2024-09-17 NOTE — PLAN OF CARE
[facility]   Physical Therapy Initial Evaluation        Date: 9/17/2024   Name: Carleen Lennon [facility] Number: [id number]    Therapy Diagnosis:   Encounter Diagnoses   Name Primary?    Urinary urgency     Myalgia of pelvic floor     Diverticulosis     Pelvic floor dysfunction Yes     Physician: Ramirez Ballard MD    Physician Orders: PT Eval and Treat   Medical Diagnosis from Referral:  Urinary urgency [R39.15], Myalgia of pelvic floor [M79.18], Diverticulosis [K57.90]   Evaluation Date: 9/17/2024  Authorization Period Expiration: 12-31-24  Plan of Care Expiration: 12/10/2024  Progress Note Due: 10/17/2024  Visit # / Visits authorized: 1/ 1   FOTO: 1/3      Precautions: Standard     Time In: 10:00 am  Time Out: 10:50 am  Total Appointment Time (timed & untimed codes): 50 minutes    SUBJECTIVE       Date of onset: April of 2024    History of current condition - Carleen reports: she drinks a lot of water. Felt a hernia in her abdomen which she was told is not urgent. By the end of April the urgency became more extreme as she could not wait. She was sent to another provider for a possible prolapse where she was told the bladder and rectum were prolapsing. Went to Dr. Ballard who told her she does not need surgery or medication at this moment. States she does have a lesion on her brain that they are monitoring (will be doing a conference next year). The lesion has not progressed following several MRIs. In April she also noticed differences in her bowel movements. She had a fissure last year that was repaired that they called a hemorrhoidectomy. She is taking metamucil and Citrucel.  Had a total hysterectomy in 1999 and an abdominoplasty in 2007. Had two vaginal deliveries with episiotomy for first and natural tearing for her second.     OB/GYN History: as above  Using vaginal estrogen cream: Yes- has been using this   Sexually active? No- but did sometimes have pain with deep  penetration  Pelvic Pain with: none- just pain with urgency  Pelvic pressure? Yes- with straining with bowel movements    Bladder/Bowel History:   Frequency of urination:   Daytime: every hour or more            Nighttime: 2-3x/night- drinks a lot of water at night  (dog and cat will wake her up and she will go pee)  Difficulty initiating urine stream: No  Urine stream: strong  Complete emptying: Yes  Post-micturition dribble? Yes  Bladder leakage: denies  Urinary Urgency: Yes.  Able to delay the urge for at least <1-3 minute(s).  Pain with delaying the urge to urinate: Yes      Frequency of bowel movements: usually would go in the morning and have three different bowel movements. Has a squatty potty   Difficulty initiating BM: Yes  Quality/Shape of BM: [place] Stool Chart type 1 this morning; can be type 4 at times  Does Patient Feel Empty after BM? No  Bowel Urgency: No.  Able to delay the urge for at least 15 minute(s).  Fiber Supplements or Laxative Use? Yes as above   Pain with BM: Yes    History of hemorrhoids/anal fissures?  Yes  Bleeding with BM: yes has had this before   Colon leakage: No      Form of protection: none reported  Number of pads required in 24 hours: NA    Types of fluid intake: Water: > ounces/day and Coffee: > 1 cup/day  Diet: did use Vi ome ; not anymore due to stomach pain   Habitus: well developed, well nourished  Abuse/Neglect: none reported today     Pain:  Location: not currently     Medical History: Carleen  has a past medical history of Brain lesion (2011), Diverticulitis, Genital herpes, Hepatitis A, and Hyperlipidemia.     Surgical History: Carleen Anthony  has a past surgical history that includes Neck surgery; Hand surgery; Knee surgery; Shoulder surgery; Tonsillectomy; Hysterectomy; Abdominal surgery; eye lid surgery; and Breast biopsy.    Medications: Carleen has a current medication list which includes the following prescription(s): aspirin, ca  carb-magnesium-caps-deondre, calcium carbonate-mag oxide, cod liver oil, cyanocobalamin, diphenhydramine, estradiol, hydrochlorothiazide, ibuprofen, lactobacillus rhamnosus gg, losartan, lutein, multivitamin with iron, multivitamin with minerals, and zinc sulfate.    Allergies:   Review of patient's allergies indicates:   Allergen Reactions    Iodine Anaphylaxis and Itching    Corticosteroids (glucocorticoids)     Meperidine Other (See Comments)    Oxycodone Itching    Unable to assess      aspartane        Imaging: None reported for this condition     Prior Therapy/Previous treatment included: None reported for this condition   Social History/Living Arrangements: lives with their spouse  Current exercise: MWF goes to [employer] and MW goes to water aerobics. Tuesdays goes to chair yoga   Occupation: retired   Prior Level of Function: Pt was independent with all ADLs and iADLs without pain, no reports of incontinence of bowel or bladder.  Current Level of Function: Pt independent with all ADLs. Bowel function impacting normal defecation.    Constitutional Symptoms Review: The patient denies having any constitutional symptoms.     Flags Screening  Red Flags:has current brain lesion being monitored. Has not changed in the last year     Pts goals: to improve bowel movements and strengthen the pelvic floor     OBJECTIVE     See EMR under MEDIA for written consent provided 9/17/2024  Chaperone: declined    ORTHO SCREEN  Posture in sitting: slouched   Posture in standing: increased lordosis, decreased knee extension (hx of B knee surgery)  Pelvic alignment: Not assessed today    Adductor Palpation: good muscle tone and health, minimal restriction in length    Gait Analysis: decreased knee extension B         ABDOMINAL WALL ASSESSMENT  Palpation: hypotonic  and trigger points   Scarring: restrictions palpable  Pelvic Floor Muscle and Transverse Abdominus Synergy: absent  Diastasis: present     BREATHING MECHANICS  ASSESSMENT   Thorax Assessment During Quiet Respiration: WNL excursion of ribcage and WNL excursion of abdominal wall  Thorax Assessment During Deep Respiration: WNL excursion of abdominal wall, Decreased excursion of lateral ribs, and Decreased excursion of posterior ribs    VAGINAL PELVIC FLOOR EXAM    EXTERNAL ASSESSMENT  Introitus: WNL  Skin condition: WNL  Scarring: none noted   Sensation: WNL   Pain: none  Voluntary contraction: visible lift and accessory muscle use  Voluntary relaxation: nil  Bearing down: accessory muscle use and visible drop  Perineal descent: absent      INTERNAL ASSESSMENT  Pain: none   Sensation: able to localized pressure appropriately   Vaginal vault: decreased length   Muscle Bulk: hypertonus   Muscle Power: 1/5  Muscle Endurance: 3 sec before muscle fatigue  # Reps To Fatigue: 5    Fast Contractions in 10 seconds: 5     Quality of contraction: decreased hold and slow relaxation   Specificity: patient contracts: glutes> abdominals, adductors   Coordination: tends to hold breath during PFM contration   Bearing down: accessory muscle use and pelvic muscle contraction prior to straining and decent of pelvic floor   Prolapse check: minimal posterior wall laxity, anterior wall lengthening above level of hymen   Does Pelvic Floor drop and relax with a diaphragmatic breath? no    Intake Outcome Measures for FOTO Bowel Cnst Survey    Therapist reviewed FOTO scores for Carleen Anthony on 9/17/2024.     FOTO report- see Media section in Epic or account episode details in FOTO.      Intake Score:   55         TREATMENT        Treatment Time In: 10:25 am  Treatment Time Out: 10:50 am  Total Treatment time (time-based codes) separate from Evaluation: 25 minutes    Therapeutic Activity Patient participated in dynamic functional therapeutic activities to improve functional performance for 25 minutes. Including: Education as described below.     [x] anatomy/physiology of pelvic floor,  posture/body mechanices, diaphragmatic breathing, double voiding techniques, kegels, proper bearing down techniques, fluid intake/dietary modifications, and behavior modifications   [x] Instruction on diaphragmatic breathing and bearing down on toilet  [x] Education on visual cues/mental imagery for pelvic floor relaxation  [x] Education on visual cues/mental imagery for pelvic floor activation   [x] Pelvic anatomy edu and impact on current level of function   [x] HEP building      Written Home Exercises and Education Provided: yes. Exercises were reviewed and Carleen was able to demonstrate them prior to the end of the session.  Carleen demonstrated good  understanding of the education provided. See EMR under Patient Instructions for exercises and education provided during therapy sessions.      ASSESSMENT     Carleen is a 73 y.o. female referred to outpatient Physical Therapy with a medical diagnosis of Urinary urgency [R39.15], Myalgia of pelvic floor [M79.18], Diverticulosis [K57.90] . Pt presents with altered posture, poor knowledge of body mechanics and posture, poor trunk stability, decreased pelvic muscle strength, decreased endurance of the pelvic muscles, decreased phasic ability of the pelvic muscles, increased frequency of urination, increased nocturia, poor fluid intake, dysfunctional defecation, and unable to co-contract or co-relax abdominal wall and pelvic floor muscles. Upon assessment, patient demonstrates difficulty with bearing down without straining/activating the pelvic floor. Poor intra-abdominal pressure management noted and pelvic muscle weakness is present with quick muscle fatigue and increased muscle tone.      Pt prognosis is Good.   Pt will benefit from skilled outpatient Physical Therapy to address the deficits stated above and in the chart below, provide pt/family education, and to maximize pt's level of independence.     Plan of care discussed with patient: Yes  Pt's spiritual, cultural  and educational needs considered and patient is agreeable to the plan of care and goals as stated below:     Anticipated Barriers for therapy: none    Medical Necessity is demonstrated by the following  History  Co-morbidities and personal factors that may impact the plan of care [x] LOW: no personal factors / co-morbidities  [] MODERATE: 1-2 personal factors / co-morbidities  [] HIGH: 3+ personal factors / co-morbidities    Moderate / High Support Documentation:   Co-morbidities affecting plan of care: brain mass    Personal Factors:   no deficits     Examination  Body Structures and Functions, activity limitations and participation restrictions that may impact the plan of care [] LOW: addressing 1-2 elements  [x] MODERATE: 3+ elements  [] HIGH: 4+ elements (please support below)    Moderate / High Support Documentation: history of cervical, shoulder, and B knee surgeries     Clinical Presentation [x] LOW: stable  [] MODERATE: Evolving  [] HIGH: Unstable     Decision Making/ Complexity Score: low         Goals:  Short Term Goals: 6 weeks   - Pt will demonstrate excellent knowledge and adherence to HEP to facilitate optimal recovery.  - Pt will demonstrate proper PFM contraction, relaxation, and lengthening coordinated with TA and breath for improved muscle coordination needed for functional activity.    Long Term Goals: 12 weeks   - Pt will demonstrate excellent knowledge and adherence to HEP for continued self-maintenance of symptoms.  - Pt will report FOTO score of 63% limited or better, indicating clinically relevant increase in function.  - Pt will report voiding interval of 2-3 hours for improved ADL tolerance.  - Pt will report ability to delay urinary urge for at least 15 minutes to maintain continence with ADL/IADLs.   - Pt will demonstrate PFM strength of at least 3/5 MMT for improved strength needed to maintain continence.   - Pt will report bearing down appropriately 100% of the time for improved bowel  function and decreased stress on adjacent pelvic structures.   - Pt will demonstrate independence with pressure-management strategies to decreased stress on adjacent pelvic structures.       PLAN     Plan of care Certification: 9/17/2024 to 12/10/2024.    Outpatient Physical Therapy 1-2 times weekly for 12 weeks to include the following interventions: therapeutic exercises, therapeutic activity, neuromuscular re-education, gait training, manual therapy, modalities PRN, patient/family education, and self care/home management, and  dry needling.       Next visit: go over education provided at initial eval and work on pelvic coordination    Marlyn Zurita, PT

## 2024-09-30 ENCOUNTER — CLINICAL SUPPORT (OUTPATIENT)
Dept: REHABILITATION | Facility: HOSPITAL | Age: 74
End: 2024-09-30
Payer: MEDICARE

## 2024-09-30 ENCOUNTER — DOCUMENTATION ONLY (OUTPATIENT)
Dept: REHABILITATION | Facility: HOSPITAL | Age: 74
End: 2024-09-30

## 2024-09-30 DIAGNOSIS — M62.89 PELVIC FLOOR DYSFUNCTION: Primary | ICD-10-CM

## 2024-09-30 PROCEDURE — 97112 NEUROMUSCULAR REEDUCATION: CPT | Mod: PO,CQ

## 2024-09-30 PROCEDURE — 97110 THERAPEUTIC EXERCISES: CPT | Mod: PO,CQ

## 2024-09-30 PROCEDURE — 97530 THERAPEUTIC ACTIVITIES: CPT | Mod: PO,CQ

## 2024-09-30 NOTE — PROGRESS NOTES
Pelvic Health Physical Therapy   Treatment Note     Name: Carleen Lennon [facility]  Clinic Number: [id number]    Therapy Diagnosis:   Encounter Diagnosis   Name Primary?    Pelvic floor dysfunction Yes     Physician: Ramirez Ballard MD    Visit Date: 9/30/2024  Physician Orders: PT Eval and Treat   Medical Diagnosis from Referral:  Urinary urgency [R39.15], Myalgia of pelvic floor [M79.18], Diverticulosis [K57.90]   Evaluation Date: 9/17/2024  Authorization Period Expiration: 12-31-24  Plan of Care Expiration: 12/10/2024  Progress Note Due: 10/17/2024  Visit # / Visits authorized: 1/ 10   FOTO: 1/3       Precautions: Standard      Time In: 3:15 am  Time Out: 4:00 am  Total Appointment Time (timed & untimed codes): 45 minutes      Subjective     Pt reports: she is scheduled to have her hernia repaired in 2 weeks.  She was compliant with home exercise program.  Response to previous treatment: good  Functional change: ongoing     Pain: 2/10  Location: bilateral back      Objective     Carleen received therapeutic exercises to develop  strength, endurance, ROM, flexibility, posture, and core stabilization for 15 minutes including:     Piriformis stretch attempted both seated and supine (unable to achieve stretch in appropriate location due to hip immobility)   Butterfly stretch 3 x 30   Prone quad stretch to target hip flexors with towel roll under knee     Carleen received the following manual therapy techniques: to develop flexibility, extensibility, and desensitization for 10 minutes including:     Bilateral hip mobs including lateral traction, long axis traction, and caudal glides     Carleen participated in neuromuscular re-education activities to develop Coordination, Control, Down training, Posture, Proprioception, Kinesthetic, Balance, and Sense for 10 minutes including: pelvic floor mm contractions focus on activation at 3 layers sequentially in isolation and then sequentially    Sequential activation of Pelvic Floor  musculature focused on isolation and reduction of accessory muscle activation       Carleen participated in dynamic functional therapeutic activities to improve functional performance for 10  minutes, including:     Education on urge suppression as needed   Education on pressure management       Home Exercises Provided and Patient Education Provided     Education provided:   - anatomy/physiology of pelvic floor, posture/body mechanices, bladder retraining, kegels, and Coordination of kegels with functional activities such as cough, laugh, sneeze, lift, etc.   Discussed progression of plan of care with patient; educated pt in activity modification; reviewed HEP with pt. Pt demonstrated and verbalized understanding of all instruction and was provided with a handout of HEP (see Patient Instructions).  - home exercise program update    Written Home Exercises Provided: yes.  Exercises were reviewed and Carleen was able to demonstrate them prior to the end of the session.  Carleen demonstrated good  understanding of the education provided.     See EMR under Patient Instructions for exercises provided 9/30/2024.    Assessment     Patient presented to clinic engaged and willing to participate. She is very limited by hip immobility which was addressed today with mobs and stretching. Home exercise program was issued to address some of this tightness. She was also issued home exercise program that included sequential contractions of Pelvic Floor. She was advised to ask her surgeon when she will be cleared to return to therapy following the surgery.   Carleen Is progressing well towards her goals.   Pt prognosis is Good.     Pt will continue to benefit from skilled outpatient physical therapy to address the deficits listed in the problem list box on initial evaluation, provide pt/family education and to maximize pt's level of independence in the home and community environment.     Pt's spiritual, cultural and educational needs  considered and pt agreeable to plan of care and goals.     Anticipated barriers to physical therapy: upcoming hernia repair surgery.     Goals: Goals:  Short Term Goals: 6 weeks   - Pt will demonstrate excellent knowledge and adherence to HEP to facilitate optimal recovery.  - Pt will demonstrate proper PFM contraction, relaxation, and lengthening coordinated with TA and breath for improved muscle coordination needed for functional activity.     Long Term Goals: 12 weeks   - Pt will demonstrate excellent knowledge and adherence to HEP for continued self-maintenance of symptoms.  - Pt will report FOTO score of 63% limited or better, indicating clinically relevant increase in function.  - Pt will report voiding interval of 2-3 hours for improved ADL tolerance.  - Pt will report ability to delay urinary urge for at least 15 minutes to maintain continence with ADL/IADLs.   - Pt will demonstrate PFM strength of at least 3/5 MMT for improved strength needed to maintain continence.   - Pt will report bearing down appropriately 100% of the time for improved bowel function and decreased stress on adjacent pelvic structures.   - Pt will demonstrate independence with pressure-management strategies to decreased stress on adjacent pelvic structures.     Plan     Plan of care Certification: 9/17/2024 to 12/10/2024.     Outpatient Physical Therapy 1-2 times weekly for 12 weeks    Next visit: go over education provided at initial eval and work on pelvic coordination     Ruth Alaniz, PTA

## 2024-10-07 ENCOUNTER — CLINICAL SUPPORT (OUTPATIENT)
Dept: REHABILITATION | Facility: HOSPITAL | Age: 74
End: 2024-10-07
Payer: MEDICARE

## 2024-10-07 DIAGNOSIS — M62.89 PELVIC FLOOR DYSFUNCTION: Primary | ICD-10-CM

## 2024-10-07 PROCEDURE — 97110 THERAPEUTIC EXERCISES: CPT | Mod: PO,CQ

## 2024-10-07 NOTE — PROGRESS NOTES
Pelvic Health Physical Therapy   Treatment Note     Name: Carleen Lennon [place]  Clinic Number: [id number]    Therapy Diagnosis:   Encounter Diagnosis   Name Primary?    Pelvic floor dysfunction Yes     Physician: Ramirez Ballard MD    Visit Date: 10/7/2024  Physician Orders: PT Eval and Treat   Medical Diagnosis from Referral:  Urinary urgency [R39.15], Myalgia of pelvic floor [M79.18], Diverticulosis [K57.90]   Evaluation Date: 9/17/2024  Authorization Period Expiration: 12-31-24  Plan of Care Expiration: 12/10/2024  Progress Note Due: 10/17/2024  Visit # / Visits authorized: 2/ 10   FOTO: 1/3       Precautions: Standard      Time In: 2:20 am  Time Out: 3:15 am  Total Appointment Time (timed & untimed codes): 55 minutes      Subjective     Pt reports: she is scheduled to have her hernia repaired next Monday.   She was compliant with home exercise program.  Response to previous treatment: good/ no soreness   Functional change: ongoing     Pain: 2/10  Location: bilateral back      Objective     Carleen received therapeutic exercises to develop  strength, endurance, ROM, flexibility, posture, and core stabilization for 55 minutes including:     Frog stretch   Alem pose   Modified pigeon   Modified child's pose   Hip flexor stretch lunge position   Piriformis stretch attempted both seated and supine (unable to achieve stretch in appropriate location due to hip immobility)   Butterfly stretch 3 x 30   Prone quad stretch to target hip flexors with towel roll under knee     Carleen received the following manual therapy techniques: to develop flexibility, extensibility, and desensitization for 0 minutes including:     Bilateral hip mobs including lateral traction, long axis traction, and caudal glides     Carleen participated in neuromuscular re-education activities to develop Coordination, Control, Down training, Posture, Proprioception, Kinesthetic, Balance, and Sense for 0 minutes including: pelvic floor mm contractions  focus on activation at 3 layers sequentially in isolation and then sequentially    Sequential activation of Pelvic Floor musculature focused on isolation and reduction of accessory muscle activation; included discussion on sequential relaxation as well       Carleen participated in dynamic functional therapeutic activities to improve functional performance for 0  minutes, including:     Education on urge suppression as needed   Education on pressure management       Home Exercises Provided and Patient Education Provided     Education provided:   - anatomy/physiology of pelvic floor, posture/body mechanices, bladder retraining, kegels, and Coordination of kegels with functional activities such as cough, laugh, sneeze, lift, etc.   Discussed progression of plan of care with patient; educated pt in activity modification; reviewed HEP with pt. Pt demonstrated and verbalized understanding of all instruction and was provided with a handout of HEP (see Patient Instructions).  - home exercise program update    Written Home Exercises Provided: yes.  Exercises were reviewed and Carleen was able to demonstrate them prior to the end of the session.  Carleen demonstrated good  understanding of the education provided.     See EMR under Patient Instructions for exercises provided 9/30/2024.    Assessment     Patient presented to clinic engaged and willing to participate. She is very limited by hip immobility which was addressed today with additional stretching and updated home exercise program. . She was advised to ask her surgeon when she will be cleared to return to therapy following the surgery. Once patient returns to therapy biofeedback and/or internal manipulation may be considered. It is likely that both pain and abdominal instability secondary to the hernia is increasing overall pelvic tension.     Carleen Is progressing well towards her goals.   Pt prognosis is Good.     Pt will continue to benefit from skilled outpatient  physical therapy to address the deficits listed in the problem list box on initial evaluation, provide pt/family education and to maximize pt's level of independence in the home and community environment.     Pt's spiritual, cultural and educational needs considered and pt agreeable to plan of care and goals.     Anticipated barriers to physical therapy: upcoming hernia repair surgery.     Goals: Goals:  Short Term Goals: 6 weeks   - Pt will demonstrate excellent knowledge and adherence to HEP to facilitate optimal recovery.  - Pt will demonstrate proper PFM contraction, relaxation, and lengthening coordinated with TA and breath for improved muscle coordination needed for functional activity.     Long Term Goals: 12 weeks   - Pt will demonstrate excellent knowledge and adherence to HEP for continued self-maintenance of symptoms.  - Pt will report FOTO score of 63% limited or better, indicating clinically relevant increase in function.  - Pt will report voiding interval of 2-3 hours for improved ADL tolerance.  - Pt will report ability to delay urinary urge for at least 15 minutes to maintain continence with ADL/IADLs.   - Pt will demonstrate PFM strength of at least 3/5 MMT for improved strength needed to maintain continence.   - Pt will report bearing down appropriately 100% of the time for improved bowel function and decreased stress on adjacent pelvic structures.   - Pt will demonstrate independence with pressure-management strategies to decreased stress on adjacent pelvic structures.     Plan     Plan of care Certification: 9/17/2024 to 12/10/2024.     Outpatient Physical Therapy 1-2 times weekly for 12 weeks    Next visit: go over education provided at initial eval and work on pelvic coordination     Ruth Alaniz, PTA

## 2025-01-24 ENCOUNTER — OFFICE VISIT (OUTPATIENT)
Dept: URGENT CARE | Facility: CLINIC | Age: 75
End: 2025-01-24
Payer: MEDICARE

## 2025-01-24 VITALS
BODY MASS INDEX: 36.7 KG/M2 | RESPIRATION RATE: 18 BRPM | OXYGEN SATURATION: 95 % | TEMPERATURE: 98 F | HEART RATE: 74 BPM | HEIGHT: 64 IN | DIASTOLIC BLOOD PRESSURE: 88 MMHG | WEIGHT: 215 LBS | SYSTOLIC BLOOD PRESSURE: 138 MMHG

## 2025-01-24 DIAGNOSIS — M79.644 FINGER PAIN, RIGHT: Primary | ICD-10-CM

## 2025-01-24 DIAGNOSIS — S63.616A SPRAIN OF RIGHT LITTLE FINGER, UNSPECIFIED SITE OF DIGIT, INITIAL ENCOUNTER: ICD-10-CM

## 2025-01-24 PROCEDURE — 99214 OFFICE O/P EST MOD 30 MIN: CPT | Mod: S$GLB,,, | Performed by: STUDENT IN AN ORGANIZED HEALTH CARE EDUCATION/TRAINING PROGRAM

## 2025-01-24 RX ORDER — IBUPROFEN 600 MG/1
600 TABLET ORAL EVERY 6 HOURS PRN
Qty: 30 TABLET | Refills: 0 | Status: SHIPPED | OUTPATIENT
Start: 2025-01-24

## 2025-01-24 RX ORDER — MULTIVITAMIN WITH IRON
1000 TABLET ORAL
COMMUNITY

## 2025-01-24 RX ORDER — PANTOPRAZOLE SODIUM 40 MG/1
TABLET, DELAYED RELEASE ORAL
COMMUNITY

## 2025-01-24 RX ORDER — METHOCARBAMOL 500 MG/1
500 TABLET, FILM COATED ORAL 4 TIMES DAILY PRN
Qty: 30 TABLET | Refills: 0 | Status: SHIPPED | OUTPATIENT
Start: 2025-01-24 | End: 2025-02-03

## 2025-01-24 RX ORDER — DICLOFENAC SODIUM 10 MG/G
1 GEL TOPICAL 4 TIMES DAILY
COMMUNITY

## 2025-01-24 NOTE — PROGRESS NOTES
"Subjective:      Patient ID: Carleen Anthony is a 74 y.o. female.    Vitals:  height is 5' 4" (1.626 m) and weight is 97.5 kg (215 lb). Her oral temperature is 97.6 °F (36.4 °C). Her blood pressure is 138/88 and her pulse is 74. Her respiration is 18 and oxygen saturation is 95%.     Chief Complaint: Hand Pain    Ambulatory to room with complaint of right hand pain specifically the 5th digit radiating up the medial aspect of her hand.  Patient states was ambulating this morning and stumbled into a door jam.    Hand Pain   Her dominant hand is their right hand. The incident occurred 6 to 12 hours ago. The incident occurred at home. The injury mechanism was a direct blow and a fall. The pain is present in the right fingers. The quality of the pain is described as shooting and stabbing. The pain radiates to the right arm. The pain is at a severity of 8/10. The pain is moderate. The pain has been Constant since the incident. Pertinent negatives include no muscle weakness or tingling. The symptoms are aggravated by movement and palpation. She has tried immobilization, elevation and NSAIDs for the symptoms. The treatment provided mild relief.       Musculoskeletal:  Positive for pain.      Objective:     Physical Exam   Constitutional: She is oriented to person, place, and time. She appears well-developed. She is cooperative.   HENT:   Head: Normocephalic and atraumatic.   Ears:   Right Ear: Hearing, tympanic membrane, external ear and ear canal normal.   Left Ear: Hearing, tympanic membrane, external ear and ear canal normal.   Nose: Nose normal. No mucosal edema or nasal deformity. No epistaxis. Right sinus exhibits no maxillary sinus tenderness and no frontal sinus tenderness. Left sinus exhibits no maxillary sinus tenderness and no frontal sinus tenderness.   Mouth/Throat: Uvula is midline, oropharynx is clear and moist and mucous membranes are normal. No trismus in the jaw. Normal dentition. No uvula swelling. " Confirmed with pharmacy that rx was received. Adelita Ramirez RN       Eyes: Conjunctivae and lids are normal.   Neck: Trachea normal and phonation normal. Neck supple.   Cardiovascular: Normal rate, regular rhythm, normal heart sounds and normal pulses.   Pulmonary/Chest: Effort normal and breath sounds normal.   Abdominal: Normal appearance and bowel sounds are normal. Soft.   Musculoskeletal: Normal range of motion.         General: Normal range of motion.      Comments: Right 5th digit it erythemic this is the mildly edematous near medial knuckle.   Neurological: She is alert and oriented to person, place, and time. She exhibits normal muscle tone.   Skin: Skin is warm, dry and intact.   Psychiatric: Her speech is normal and behavior is normal. Judgment and thought content normal.   Nursing note and vitals reviewed.      Assessment:     1. Finger pain, right    2. Sprain of right little finger, unspecified site of digit, initial encounter        Plan:       Finger pain, right  -     XR FINGER 2 OR MORE VIEWS; Future; Expected date: 01/24/2025    Sprain of right little finger, unspecified site of digit, initial encounter  -     Physician communication order    Other orders  -     ibuprofen (ADVIL,MOTRIN) 600 MG tablet; Take 1 tablet (600 mg total) by mouth every 6 (six) hours as needed for Pain.  Dispense: 30 tablet; Refill: 0  -     methocarbamoL (ROBAXIN) 500 MG Tab; Take 1 tablet (500 mg total) by mouth 4 (four) times daily as needed.  Dispense: 30 tablet; Refill: 0           Imaging in clinic shows no acute osseous abnormalities  Splint right 5th digit  R.I.C.E. therapy.  Rest, ice, compress, elevate.  For the next 24-48 hours please continue rest, intermittently use ice packs and continue to wear your Ace wrap, maintain elevation to the limb.  - To ED for any new or acutely worsening symptoms including but not limited to chest pain, palpitations, shortness of breath, or fever greater than 103° F.  Patient in agreement with plan of care.    - The diagnosis, treatment plan,  instructions for follow-up and reevaluation as well as ED precautions were discussed and understanding was verbalized. All questions or concerns have been addressed.  -Follow up with your primary care provider for continued evaluation and management.

## 2025-01-24 NOTE — PATIENT INSTRUCTIONS
Thankyou for the opportunity to take care of you today.  Please take all medications as directed, continue any previous prescribed medications unless we specifically discussed holding them.  If  your situation becomes emergent please present to the nearest emergency department.  Follow-up with your PCP for continued evaluation and management.    R.I.C.E. therapy.  Rest, ice, compress, elevate.  For the next 24-48 hours please continue rest, intermittently use ice packs and continue to wear your Ace wrap, maintain elevation to the limb.

## 2025-02-18 ENCOUNTER — OFFICE VISIT (OUTPATIENT)
Dept: ORTHOPEDICS | Facility: CLINIC | Age: 75
End: 2025-02-18
Payer: MEDICARE

## 2025-02-18 ENCOUNTER — HOSPITAL ENCOUNTER (OUTPATIENT)
Dept: RADIOLOGY | Facility: HOSPITAL | Age: 75
Discharge: HOME | End: 2025-02-18
Attending: ORTHOPAEDIC SURGERY
Payer: MEDICARE

## 2025-02-18 VITALS — BODY MASS INDEX: 36.54 KG/M2 | WEIGHT: 214 LBS | HEIGHT: 64 IN

## 2025-02-18 DIAGNOSIS — M19.041 ARTHRITIS OF RIGHT HAND: ICD-10-CM

## 2025-02-18 DIAGNOSIS — M18.12 ARTHRITIS OF CARPOMETACARPAL (CMC) JOINT OF LEFT THUMB: ICD-10-CM

## 2025-02-18 DIAGNOSIS — S62.639A CLOSED AVULSION FRACTURE OF DISTAL PHALANX OF FINGER, INITIAL ENCOUNTER: Primary | ICD-10-CM

## 2025-02-18 DIAGNOSIS — M19.042 ARTHRITIS OF LEFT HAND: ICD-10-CM

## 2025-02-18 PROCEDURE — 73130 X-RAY EXAM OF HAND: CPT | Mod: TC,50,PN

## 2025-02-18 RX ORDER — AMLODIPINE BESYLATE 5 MG/1
1 TABLET ORAL DAILY
COMMUNITY
Start: 2025-02-12 | End: 2026-02-12

## 2025-02-18 RX ORDER — IBUPROFEN 200 MG
200 TABLET ORAL EVERY 6 HOURS PRN
COMMUNITY

## 2025-02-18 RX ORDER — LOSARTAN POTASSIUM 50 MG/1
50 TABLET ORAL DAILY
COMMUNITY
Start: 2024-11-25 | End: 2025-11-24

## 2025-02-18 NOTE — PROGRESS NOTES
[facility]    Subjective:     Chief Complaint:   Chief Complaint   Patient presents with    Right Hand - Pain     Dorsal corner fracture at the PIP joint of the right little finger on 01/24/25- tripped and went into side of door, she went to [facility], brought a XR on paper with her, / Left thumb pain-burning pain every morning,        Past Medical History:   Diagnosis Date    Brain lesion 2011    Benign - no Tx    Diverticulitis     Genital herpes     Hepatitis A     Hyperlipidemia        Past Surgical History:   Procedure Laterality Date    ABDOMINAL SURGERY      tummy tuck    BREAST BIOPSY      eye lid surgery      Bilateral    HAND SURGERY      HYSTERECTOMY      KNEE SURGERY      NECK SURGERY      SHOULDER SURGERY      TONSILLECTOMY         Current Outpatient Medications   Medication Sig    amLODIPine (NORVASC) 5 MG tablet Take 1 tablet by mouth once daily.    aspirin (ECOTRIN) 81 MG EC tablet Take 1 tablet by mouth every morning.    Ca carb-magnesium-caps-ginger 438--50 mg Tab Take by mouth.    calcium carbonate-mag oxide (SUPER BRAYAN-MAG) 333-167 mg Tab Super C    cyanocobalamin (VITAMIN B-12) 100 MCG tablet Take 500 mcg by mouth.    elderberry fruit 50 mg/0.5 mL Drop Take by mouth.    estradioL (ESTRACE) 0.01 % (0.1 mg/gram) vaginal cream 0.5 grams with applicator or dime-sized amount with finger in vagina nightly x 2 weeks, then twice a week thereafter    hydroCHLOROthiazide (MICROZIDE) 12.5 mg capsule     ibuprofen (ADVIL) 200 MG tablet Take 200 mg by mouth every 6 (six) hours as needed for Pain.    ibuprofen (ADVIL,MOTRIN) 600 MG tablet Take 1 tablet (600 mg total) by mouth every 6 (six) hours as needed for Pain.    Lactobacillus rhamnosus GG (CULTURELLE) 10 billion cell capsule Take 1 capsule by mouth once daily.    losartan (COZAAR) 50 MG tablet Take 50 mg by mouth once daily. Take 2 50 mg tablets daily    lutein 10 mg Tab Take 20 mg by mouth.    multivitamin with iron  (DAILY MULTIPLE VITAMINS/IRON ORAL) Take 2 tablets by mouth.    multivitamin with minerals (HAIR,SKIN AND NAILS ORAL) Take by mouth.    multivitamin with minerals tablet Take 1 tablet by mouth once daily.    omega-3 fatty acids/fish oil (FISH OIL-OMEGA-3 FATTY ACIDS) 300-1,000 mg capsule Take 1,000 mg by mouth.    zinc sulfate (ZINC-15) 66 mg Tab zinc    COD LIVER OIL ORAL Take by mouth. (Patient not taking: Reported on 2/18/2025)    diclofenac sodium (VOLTAREN) 1 % Gel Apply 1 application  topically 4 (four) times daily. (Patient not taking: Reported on 2/18/2025)    diphenhydrAMINE (BENADRYL) 25 mg capsule  (Patient not taking: Reported on 2/18/2025)    ibuprofen (ADVIL,MOTRIN) 800 MG tablet Take 1 tablet by mouth. (Patient not taking: Reported on 2/18/2025)    pantoprazole (PROTONIX) 40 MG tablet  (Patient not taking: Reported on 2/18/2025)     No current facility-administered medications for this visit.       Review of patient's allergies indicates:   Allergen Reactions    Iodine Anaphylaxis and Itching    Corticosteroids (glucocorticoids)     Meperidine Other (See Comments)    Oxycodone Itching    Unable to assess      aspartane       No family history on file.    Social History[1]    History of present illness:  Patient comes in today as a new patient chief complaint of right pinky and left thumb pain after a trip and near fall right at a month ago.  She is right-hand dominant.  She has been seen by an outside orthopedist told she had a fracture of the proximal end of the middle phalanx of the right pinky finger.    Review of Systems:    Constitution: Negative for chills, fever, and sweats.  Negative for unexplained weight loss.    HENT:  Negative for headaches and blurry vision.    Cardiovascular:Negative for chest pain or irregular heart beat. Negative for hypertension.    Respiratory:  Negative for cough and shortness of breath.    Gastrointestinal: Negative for abdominal pain, heartburn, melena, nausea,  and vomitting.    Genitourinary:  Negative bladder incontinence and dysuria.    Musculoskeletal:  See HPI for details.     Neurological: Negative for numbness.    Psychiatric/Behavioral: Negative for depression.  The patient is not nervous/anxious.      Endocrine: Negative for polyuria    Hematologic/Lymphatic: Negative for bleeding problem.  Does not bruise/bleed easily.    Skin: Negative for poor would healing and rash    Objective:      Physical Examination:    Vital Signs:  There were no vitals filed for this visit.    Body mass index is 36.73 kg/m².    This a well-developed, well nourished patient in no acute distress.  They are alert and oriented and cooperative to examination.        Bilateral hand exam: Skin to bilateral hands clean dry and intact.  No erythema or ecchymosis bilaterally.  No signs or symptoms of infection bilaterally.  She does have a slight flexion deformity to the PIP joint of the right pinky finger.  She lacks about 20° of full extension.  She flexes that joint to about 90° actively.  It is diffusely tender to palpation.  For the left hand, she she has diffuse tenderness about the left thumb carpometacarpal joint.  No anatomical snuffbox tenderness.  Negative Finkelstein's.  Positive basilar grind.      Pertinent New Results:    XRAY Report / Interpretation:   Three views taken of bilateral hands today: AP, lateral, oblique views.  She has diffuse arthrosis about bilateral thumb carpometacarpal joints.  She has a fracture of the proximal portion of the middle phalanx.  Appears to be an avulsion fracture of an osteophyte.  Diffuse arthrosis throughout the IP joints in both hands.    Assessment/Plan:      1. Right pinky finger middle phalanx fracture.    2. Left thumb basilar joint arthrosis.      She has fairly advanced arthrosis throughout the IP joints in her hand.  The most concerning issue is the current reduced range of motion of the PIP joint of the left pinky finger.  We will refer  "to a dedicated hand specialist, Dr. Saldivar for further evaluation and more specific treatment at his discretion of course.  We will get her into OT to work on range of motion and try to reduce stiffness.  She should not immobilize this any further unless directed by Dr. Saldivar.  Follow up with us on an as-needed basis.    Reinaldo Urbina, Physician Assistant, served in the capacity as a "scribe" for this patient encounter.  A "face-to-face" encounter occurred with Dr. Shashi Adrian on this date.  The treatment plan and medical decision-making is outlined above. Patient was seen and examined with a chaperone.         This note was created using Dragon voice recognition software that occasionally misinterpreted phrases or words.               [1]   Social History  Socioeconomic History    Marital status:    Tobacco Use    Smoking status: Never    Smokeless tobacco: Never   Substance and Sexual Activity    Alcohol use: Yes     Comment: occasionally    Drug use: No     "

## 2025-03-19 ENCOUNTER — HOSPITAL ENCOUNTER (OUTPATIENT)
Dept: RADIOLOGY | Facility: HOSPITAL | Age: 75
Discharge: HOME | End: 2025-03-19
Attending: ORTHOPAEDIC SURGERY
Payer: MEDICARE

## 2025-03-19 ENCOUNTER — OFFICE VISIT (OUTPATIENT)
Dept: ORTHOPEDICS | Facility: CLINIC | Age: 75
End: 2025-03-19
Payer: MEDICARE

## 2025-03-19 DIAGNOSIS — S62.626A CLOSED DISPLACED FRACTURE OF MIDDLE PHALANX OF RIGHT LITTLE FINGER, INITIAL ENCOUNTER: ICD-10-CM

## 2025-03-19 DIAGNOSIS — S62.626A CLOSED DISPLACED FRACTURE OF MIDDLE PHALANX OF RIGHT LITTLE FINGER, INITIAL ENCOUNTER: Primary | ICD-10-CM

## 2025-03-19 PROCEDURE — 1159F MED LIST DOCD IN RCRD: CPT | Mod: CPTII,S$GLB,, | Performed by: ORTHOPAEDIC SURGERY

## 2025-03-19 PROCEDURE — 73140 X-RAY EXAM OF FINGER(S): CPT | Mod: TC,PO

## 2025-03-19 PROCEDURE — 4010F ACE/ARB THERAPY RXD/TAKEN: CPT | Mod: CPTII,S$GLB,, | Performed by: ORTHOPAEDIC SURGERY

## 2025-03-19 PROCEDURE — 3288F FALL RISK ASSESSMENT DOCD: CPT | Mod: CPTII,S$GLB,, | Performed by: ORTHOPAEDIC SURGERY

## 2025-03-19 PROCEDURE — 99204 OFFICE O/P NEW MOD 45 MIN: CPT | Mod: S$GLB,,, | Performed by: ORTHOPAEDIC SURGERY

## 2025-03-19 PROCEDURE — 1125F AMNT PAIN NOTED PAIN PRSNT: CPT | Mod: CPTII,S$GLB,, | Performed by: ORTHOPAEDIC SURGERY

## 2025-03-19 PROCEDURE — 99999 PR PBB SHADOW E&M-EST. PATIENT-LVL III: CPT | Mod: PBBFAC,,, | Performed by: ORTHOPAEDIC SURGERY

## 2025-03-19 PROCEDURE — 1100F PTFALLS ASSESS-DOCD GE2>/YR: CPT | Mod: CPTII,S$GLB,, | Performed by: ORTHOPAEDIC SURGERY

## 2025-03-19 PROCEDURE — 73140 X-RAY EXAM OF FINGER(S): CPT | Mod: 26,RT,, | Performed by: RADIOLOGY

## 2025-03-19 RX ORDER — CELECOXIB 100 MG/1
100 CAPSULE ORAL 2 TIMES DAILY
Qty: 28 CAPSULE | Refills: 0 | Status: SHIPPED | OUTPATIENT
Start: 2025-03-19 | End: 2025-04-02

## 2025-03-19 NOTE — PROGRESS NOTES
3/19/2025    Chief Complaint:  Chief Complaint   Patient presents with    Right Hand - Pain, Injury, Swelling     Fell into corner of door in January       HPI:  Carleen Anthony is a 74 y.o. female, who presents to clinic today she has a history of an injury to her right small finger.  This occurred in January of this year.  She had multiple other medical and family issues which limited her ability to get immediate care.  She was eventually told she did have a fracture.  She was started on some therapy.  She has had stiffness of the finger as well as of the entire hand since that time.  She is here today for evaluation and further treatment options.    PMHX:  Past Medical History:   Diagnosis Date    Brain lesion 2011    Benign - no Tx    Diverticulitis     Genital herpes     Hepatitis A     Hyperlipidemia        PSHX:  Past Surgical History:   Procedure Laterality Date    ABDOMINAL SURGERY      tummy tuck    BREAST BIOPSY      eye lid surgery      Bilateral    HAND SURGERY      HYSTERECTOMY      KNEE SURGERY      NECK SURGERY      SHOULDER SURGERY      TONSILLECTOMY         FMHX:  No family history on file.    SOCHX:  Social History     Tobacco Use    Smoking status: Never    Smokeless tobacco: Never   Substance Use Topics    Alcohol use: Yes     Comment: occasionally       ALLERGIES:  Iodine, Corticosteroids (glucocorticoids), Meperidine, Oxycodone, and Unable to assess    CURRENT MEDICATIONS:  Medications Ordered Prior to Encounter[1]    REVIEW OF SYSTEMS:  Review of Systems   Constitutional: Negative.    HENT: Negative.     Eyes: Negative.    Respiratory: Negative.     Cardiovascular: Negative.    Gastrointestinal: Negative.    Genitourinary: Negative.    Musculoskeletal:  Positive for falls and joint pain.   Skin: Negative.    Neurological:  Positive for weakness.   Endo/Heme/Allergies: Negative.    Psychiatric/Behavioral: Negative.       GENERAL PHYSICAL EXAM:   There were no vitals taken for this  visit.   GEN: well developed, well nourished, no acute distress   HENT: Normocephalic, atraumatic   EYES: No discharge, conjunctiva normal   NECK: Supple, non-tender   PULM: No wheezing, no respiratory distress   CV: RRR   ABD: Soft, non-tender    ORTHO EXAM:   Examination of the right hand and small finger reveals that there is some swelling about the entire hand.  This has located over the MCP and IP joints.  There has a significant amount of swelling of the small finger with a small ulnar deviation at the PIP joint.  Palpation about the hands does not produce tenderness but she does have a small amount of tenderness overlying the PIP joint of the small finger.  Attempts at composite flexion leave her 1 cm short of the distal palmar crease with her index middle and ring fingers and 1.5 cm short with the small finger.  He does report grossly intact sensation and capillary refill is less than 2 seconds    RADIOLOGY:   X-rays of the right small finger were taken in clinic today there was noted to be severe arthritis throughout multiple joints.  She also was noted have an avulsion fracture from the base of the middle phalanx.  The joint remains well located.    ASSESSMENT:   Right small finger middle phalanx avulsion fracture with contracture and severe arthritis    PLAN:  1. I have discussed possibly starting her on a course of steroid to limit the inflammation but she does not tolerate steroids well and therefore will start her on a high dose anti-inflammatory.  I have cautioned her about watching her stomach and her kidney function does appear to be appropriate for anti-inflammatory therapy     2. She will continue to work aggressively with therapy.  Has a allowed to be weight-bearing as tolerated     3. She can follow up with me in 4 weeks for repeat evaluation           [1]   Current Outpatient Medications on File Prior to Visit   Medication Sig Dispense Refill    amLODIPine (NORVASC) 5 MG tablet Take 1 tablet by  mouth once daily.      aspirin (ECOTRIN) 81 MG EC tablet Take 1 tablet by mouth every morning.      Ca carb-magnesium-caps-ginger 612--50 mg Tab Take by mouth.      calcium carbonate-mag oxide (SUPER BRAYAN-MAG) 333-167 mg Tab Super C      COD LIVER OIL ORAL Take by mouth. (Patient not taking: Reported on 3/19/2025)      cyanocobalamin (VITAMIN B-12) 100 MCG tablet Take 500 mcg by mouth.      diclofenac sodium (VOLTAREN) 1 % Gel Apply 1 application  topically 4 (four) times daily. (Patient not taking: Reported on 3/19/2025)      diphenhydrAMINE (BENADRYL) 25 mg capsule  (Patient not taking: Reported on 3/19/2025)      elderberry fruit 50 mg/0.5 mL Drop Take by mouth.      estradioL (ESTRACE) 0.01 % (0.1 mg/gram) vaginal cream 0.5 grams with applicator or dime-sized amount with finger in vagina nightly x 2 weeks, then twice a week thereafter 42.5 g 11    hydroCHLOROthiazide (MICROZIDE) 12.5 mg capsule       ibuprofen (ADVIL) 200 MG tablet Take 200 mg by mouth every 6 (six) hours as needed for Pain.      ibuprofen (ADVIL,MOTRIN) 600 MG tablet Take 1 tablet (600 mg total) by mouth every 6 (six) hours as needed for Pain. 30 tablet 0    ibuprofen (ADVIL,MOTRIN) 800 MG tablet Take 1 tablet by mouth.      Lactobacillus rhamnosus GG (CULTURELLE) 10 billion cell capsule Take 1 capsule by mouth once daily.      losartan (COZAAR) 50 MG tablet Take 50 mg by mouth once daily. Take 2 50 mg tablets daily      lutein 10 mg Tab Take 20 mg by mouth.      multivitamin with iron (DAILY MULTIPLE VITAMINS/IRON ORAL) Take 2 tablets by mouth.      multivitamin with minerals (HAIR,SKIN AND NAILS ORAL) Take by mouth.      multivitamin with minerals tablet Take 1 tablet by mouth once daily.      omega-3 fatty acids/fish oil (FISH OIL-OMEGA-3 FATTY ACIDS) 300-1,000 mg capsule Take 1,000 mg by mouth.      pantoprazole (PROTONIX) 40 MG tablet       zinc sulfate (ZINC-15) 66 mg Tab zinc       No current facility-administered medications on  file prior to visit.

## 2025-03-27 ENCOUNTER — TELEPHONE (OUTPATIENT)
Dept: UROGYNECOLOGY | Facility: CLINIC | Age: 75
End: 2025-03-27
Payer: MEDICARE

## 2025-05-05 ENCOUNTER — OFFICE VISIT (OUTPATIENT)
Dept: ORTHOPEDICS | Facility: CLINIC | Age: 75
End: 2025-05-05
Payer: MEDICARE

## 2025-05-05 DIAGNOSIS — S62.626A CLOSED DISPLACED FRACTURE OF MIDDLE PHALANX OF RIGHT LITTLE FINGER, INITIAL ENCOUNTER: Primary | ICD-10-CM

## 2025-05-05 PROCEDURE — 1101F PT FALLS ASSESS-DOCD LE1/YR: CPT | Mod: CPTII,S$GLB,, | Performed by: ORTHOPAEDIC SURGERY

## 2025-05-05 PROCEDURE — 1159F MED LIST DOCD IN RCRD: CPT | Mod: CPTII,S$GLB,, | Performed by: ORTHOPAEDIC SURGERY

## 2025-05-05 PROCEDURE — 99213 OFFICE O/P EST LOW 20 MIN: CPT | Mod: S$GLB,,, | Performed by: ORTHOPAEDIC SURGERY

## 2025-05-05 PROCEDURE — 1125F AMNT PAIN NOTED PAIN PRSNT: CPT | Mod: CPTII,S$GLB,, | Performed by: ORTHOPAEDIC SURGERY

## 2025-05-05 PROCEDURE — 3288F FALL RISK ASSESSMENT DOCD: CPT | Mod: CPTII,S$GLB,, | Performed by: ORTHOPAEDIC SURGERY

## 2025-05-05 PROCEDURE — 4010F ACE/ARB THERAPY RXD/TAKEN: CPT | Mod: CPTII,S$GLB,, | Performed by: ORTHOPAEDIC SURGERY

## 2025-05-05 PROCEDURE — 99999 PR PBB SHADOW E&M-EST. PATIENT-LVL III: CPT | Mod: PBBFAC,,, | Performed by: ORTHOPAEDIC SURGERY

## 2025-05-05 NOTE — PROGRESS NOTES
Ms Anthony returns to clinic today.  Has a history of an injury to her right small finger.  She had an avulsion fracture from the insertion of the central slip.  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  She has had limitation of motion and pain.  She has worked on this with anti-inflammatories and therapy.  She has had some improvement but she still has some stiffness and soreness in the finger     Physical exam: Examination of the right small finger reveals that there is still a small amount of erythema.  There was some edema to the finger noted.  She has a 20 degree extensor lag.  She is able to flex to 70° at the PIP joint.  D IP motion is very limited to pre-existing arthritis.  She does have sensation intact at the tip     Assessment: Right small finger central slip/middle phalanx avulsion     Plan:    1.  She is able to extend to within 20° from full extension.  She also has some limitation of flexion.  We can continue to work on this with range of motion activities with therapy.  We have also discussed anti-inflammatory therapies.  She can do topical such as Voltaren.  She did not tolerate the Celebrex really well and therefore she has reverted back to doing I will leave which she is tolerating     2. She will follow up with me as needed

## 2025-05-16 ENCOUNTER — LAB VISIT (OUTPATIENT)
Dept: LAB | Facility: HOSPITAL | Age: 75
End: 2025-05-16
Attending: SURGERY
Payer: MEDICARE

## 2025-05-16 ENCOUNTER — OFFICE VISIT (OUTPATIENT)
Dept: SURGERY | Facility: CLINIC | Age: 75
End: 2025-05-16
Payer: MEDICARE

## 2025-05-16 VITALS — DIASTOLIC BLOOD PRESSURE: 89 MMHG | TEMPERATURE: 97 F | HEART RATE: 85 BPM | SYSTOLIC BLOOD PRESSURE: 144 MMHG

## 2025-05-16 DIAGNOSIS — K43.2 INCISIONAL HERNIA: ICD-10-CM

## 2025-05-16 DIAGNOSIS — N30.00 ACUTE CYSTITIS WITHOUT HEMATURIA: ICD-10-CM

## 2025-05-16 DIAGNOSIS — K46.0 INCARCERATED HERNIA: ICD-10-CM

## 2025-05-16 DIAGNOSIS — K43.2 INCISIONAL HERNIA, WITHOUT OBSTRUCTION OR GANGRENE: Primary | ICD-10-CM

## 2025-05-16 LAB
BILIRUB UR QL STRIP.AUTO: NEGATIVE
CLARITY UR: CLEAR
COLOR UR AUTO: YELLOW
GLUCOSE UR QL STRIP: NEGATIVE
HGB UR QL STRIP: NEGATIVE
KETONES UR QL STRIP: NEGATIVE
LEUKOCYTE ESTERASE UR QL STRIP: NEGATIVE
NITRITE UR QL STRIP: NEGATIVE
PH UR STRIP: 7 [PH]
PROT UR QL STRIP: NEGATIVE
SP GR UR STRIP: 1
UROBILINOGEN UR STRIP-ACNC: NEGATIVE EU/DL

## 2025-05-16 PROCEDURE — 1159F MED LIST DOCD IN RCRD: CPT | Mod: CPTII,S$GLB,, | Performed by: SURGERY

## 2025-05-16 PROCEDURE — 1125F AMNT PAIN NOTED PAIN PRSNT: CPT | Mod: CPTII,S$GLB,, | Performed by: SURGERY

## 2025-05-16 PROCEDURE — 3079F DIAST BP 80-89 MM HG: CPT | Mod: CPTII,S$GLB,, | Performed by: SURGERY

## 2025-05-16 PROCEDURE — 99999 PR PBB SHADOW E&M-EST. PATIENT-LVL III: CPT | Mod: PBBFAC,,, | Performed by: SURGERY

## 2025-05-16 PROCEDURE — 4010F ACE/ARB THERAPY RXD/TAKEN: CPT | Mod: CPTII,S$GLB,, | Performed by: SURGERY

## 2025-05-16 PROCEDURE — 99204 OFFICE O/P NEW MOD 45 MIN: CPT | Mod: S$GLB,,, | Performed by: SURGERY

## 2025-05-16 PROCEDURE — 3077F SYST BP >= 140 MM HG: CPT | Mod: CPTII,S$GLB,, | Performed by: SURGERY

## 2025-05-16 PROCEDURE — 81003 URINALYSIS AUTO W/O SCOPE: CPT

## 2025-05-16 RX ORDER — TELMISARTAN 80 MG/1
80 TABLET ORAL
COMMUNITY
Start: 2025-05-01 | End: 2026-05-01

## 2025-05-16 RX ORDER — FLUTICASONE PROPIONATE 50 MCG
1 SPRAY, SUSPENSION (ML) NASAL
COMMUNITY
Start: 2025-03-18 | End: 2026-03-18

## 2025-05-16 RX ORDER — HYDROCHLOROTHIAZIDE 25 MG/1
25 TABLET ORAL
COMMUNITY
Start: 2025-05-01 | End: 2026-05-01

## 2025-05-16 RX ORDER — ROSUVASTATIN CALCIUM 10 MG/1
10 TABLET, COATED ORAL NIGHTLY
COMMUNITY
Start: 2025-02-12 | End: 2026-02-12

## 2025-05-16 RX ORDER — CEFAZOLIN SODIUM 2 G/50ML
2 SOLUTION INTRAVENOUS
OUTPATIENT
Start: 2025-05-16

## 2025-05-19 NOTE — H&P (VIEW-ONLY)
GENERAL SURGERY  OUTPATIENT H&P    REASON FOR VISIT/CC: Ventral hernia    HPI: Carleen Anthony is a 74 y.o. female for evaluation of incisional hernia.  Patient has undergone previous colon resection for benign disease in 2014. Over time this has developed a bulge near the umbilicus which may be enlarging. Had discussed operative repair with outside surgeon who has since moved. Was referred to to me by Dr. Faith. No obstructive symptoms.  No overlying skin changes.    Previous abdominal surgical history includes and assisted laparoscopic sigmoidectomy, hysterectomy and abdominal plasty.    I have reviewed the patient's chart including prior progress notes, procedures and testing.     ROS:   Review of Systems    PROBLEM LIST:  Problem List[1]      HISTORY  Past Medical History:   Diagnosis Date    Brain lesion 2011    Benign - no Tx    Diverticulitis     Genital herpes     Hepatitis A     Hyperlipidemia        Past Surgical History:   Procedure Laterality Date    ABDOMINAL SURGERY      tummy tuck    BREAST BIOPSY      eye lid surgery      Bilateral    HAND SURGERY      HYSTERECTOMY      KNEE SURGERY      NECK SURGERY      SHOULDER SURGERY      TONSILLECTOMY         Social History[2]    No family history on file.      MEDS:  Medications Ordered Prior to Encounter[3]    ALLERGIES:  Review of patient's allergies indicates:   Allergen Reactions    Iodine Anaphylaxis and Itching    Corticosteroids (glucocorticoids)     Meperidine Other (See Comments)    Oxycodone Itching    Unable to assess      aspartane         VITALS:  Vitals:    05/16/25 1011   BP: (!) 144/89   Pulse: 85   Temp: 97.2 °F (36.2 °C)         PHYSICAL EXAM:  Physical Exam  Vitals reviewed.   Constitutional:       General: She is not in acute distress.     Appearance: Normal appearance. She is well-developed.   HENT:      Head: Normocephalic and atraumatic.      Nose: Nose normal.   Eyes:      General: No scleral icterus.     Conjunctiva/sclera:  "Conjunctivae normal.   Neck:      Trachea: No tracheal tenderness or tracheal deviation.   Cardiovascular:      Rate and Rhythm: Normal rate and regular rhythm.      Pulses: Normal pulses.   Pulmonary:      Effort: Pulmonary effort is normal. No accessory muscle usage or respiratory distress.      Breath sounds: Normal breath sounds.   Abdominal:      General: There is no distension.      Palpations: Abdomen is soft.      Tenderness: There is no abdominal tenderness.      Hernia: A hernia (Reducible hernia around the umbilicus however fascial edges difficult to clearly identify, felt to be a least 3 x 3 cm) is present.   Musculoskeletal:         General: No swelling or tenderness. Normal range of motion.      Cervical back: Normal range of motion and neck supple. No rigidity.   Skin:     General: Skin is warm and dry.      Coloration: Skin is not jaundiced.      Findings: No erythema.   Neurological:      General: No focal deficit present.      Mental Status: She is alert and oriented to person, place, and time.      Motor: No weakness or abnormal muscle tone.   Psychiatric:         Mood and Affect: Mood normal.         Behavior: Behavior normal.         Thought Content: Thought content normal.         Judgment: Judgment normal.           LABS:  Lab Results   Component Value Date    WBC 13.90 (H) 10/25/2020    RBC 4.34 10/25/2020    HGB 12.9 10/25/2020    HCT 38.5 10/25/2020     10/25/2020     Lab Results   Component Value Date    GLU 85 08/16/2024     08/16/2024    K 4.4 08/16/2024     08/16/2024    CO2 30 (H) 08/16/2024    BUN 13 08/16/2024    CREATININE 0.8 08/16/2024    CALCIUM 10.0 08/16/2024     Lab Results   Component Value Date    ALT 20 10/25/2020    AST 19 10/25/2020    ALKPHOS 65 10/25/2020    BILITOT 1.3 (H) 10/25/2020     No results found for: "MG", "PHOS"    STUDIES:  Images and reports were personally reviewed.  Outside CT imaging results reviewed, no mention of hernia which is " easily palpable      ASSESSMENT & PLAN:  74 y.o. female with incisional hernia  -based off physical exam I feel the patient has a least a 3 x 3 cm incisional hernia which is reducible at this time  -reasonable candidate for robotic approach with mesh however uncertain about intra-abdominal adhesive disease from previous surgeries therefore I have possibility of needing to convert to open  -versus surgery including pain, bleeding, scarring, infection, recurrence, seroma, hematoma, injury to bowel, injury to other organs were reviewed and patient expressed understanding these risks  -will schedule for repair on 06/10/2025  -labs and EKG               [1]   Patient Active Problem List  Diagnosis    Diverticulitis    Ingrown nail    Pain of right great toe    Chronic constipation    Rectocele, female    Urinary incontinence, mixed    Vaginal atrophy    Brain mass    Urinary urgency    Diverticulosis    Myalgia of pelvic floor    Cystocele, midline    Umbilical hernia without obstruction and without gangrene    Pelvic floor dysfunction   [2]   Social History  Tobacco Use    Smoking status: Never    Smokeless tobacco: Never   Substance Use Topics    Alcohol use: Yes     Comment: occasionally    Drug use: No   [3]   Current Outpatient Medications on File Prior to Visit   Medication Sig Dispense Refill    aspirin (ECOTRIN) 81 MG EC tablet Take 1 tablet by mouth every morning.      Ca carb-magnesium-caps-deondre 961--50 mg Tab Take by mouth.      cyanocobalamin (VITAMIN B-12) 100 MCG tablet Take 500 mcg by mouth.      diclofenac sodium (VOLTAREN) 1 % Gel Apply 1 application  topically 4 (four) times daily.      estradioL (ESTRACE) 0.01 % (0.1 mg/gram) vaginal cream 0.5 grams with applicator or dime-sized amount with finger in vagina nightly x 2 weeks, then twice a week thereafter 42.5 g 11    fluticasone propionate (FLONASE) 50 mcg/actuation nasal spray 1 spray by Nasal route.      hydroCHLOROthiazide (HYDRODIURIL) 25  MG tablet Take 25 mg by mouth.      Lactobacillus rhamnosus GG (CULTURELLE) 10 billion cell capsule Take 1 capsule by mouth once daily.      lutein 10 mg Tab Take 20 mg by mouth.      multivitamin with minerals (HAIR,SKIN AND NAILS ORAL) Take by mouth.      multivitamin with minerals tablet Take 1 tablet by mouth once daily.      omega-3 fatty acids/fish oil (FISH OIL-OMEGA-3 FATTY ACIDS) 300-1,000 mg capsule Take 1,000 mg by mouth.      telmisartan (MICARDIS) 80 MG Tab Take 80 mg by mouth.      zinc sulfate (ZINC-15) 66 mg Tab zinc      amLODIPine (NORVASC) 5 MG tablet Take 1 tablet by mouth once daily. (Patient not taking: Reported on 5/16/2025)      rosuvastatin (CRESTOR) 10 MG tablet Take 10 mg by mouth every evening. (Patient not taking: Reported on 5/16/2025)       No current facility-administered medications on file prior to visit.

## 2025-05-19 NOTE — H&P
GENERAL SURGERY  OUTPATIENT H&P    REASON FOR VISIT/CC: Ventral hernia    HPI: Carleen Anthony is a 74 y.o. female for evaluation of incisional hernia.  Patient has undergone previous colon resection for benign disease in 2014. Over time this has developed a bulge near the umbilicus which may be enlarging. Had discussed operative repair with outside surgeon who has since moved. Was referred to to me by Dr. Faith. No obstructive symptoms.  No overlying skin changes.    Previous abdominal surgical history includes and assisted laparoscopic sigmoidectomy, hysterectomy and abdominal plasty.    I have reviewed the patient's chart including prior progress notes, procedures and testing.     ROS:   Review of Systems    PROBLEM LIST:  Problem List[1]      HISTORY  Past Medical History:   Diagnosis Date    Brain lesion 2011    Benign - no Tx    Diverticulitis     Genital herpes     Hepatitis A     Hyperlipidemia        Past Surgical History:   Procedure Laterality Date    ABDOMINAL SURGERY      tummy tuck    BREAST BIOPSY      eye lid surgery      Bilateral    HAND SURGERY      HYSTERECTOMY      KNEE SURGERY      NECK SURGERY      SHOULDER SURGERY      TONSILLECTOMY         Social History[2]    No family history on file.      MEDS:  Medications Ordered Prior to Encounter[3]    ALLERGIES:  Review of patient's allergies indicates:   Allergen Reactions    Iodine Anaphylaxis and Itching    Corticosteroids (glucocorticoids)     Meperidine Other (See Comments)    Oxycodone Itching    Unable to assess      aspartane         VITALS:  Vitals:    05/16/25 1011   BP: (!) 144/89   Pulse: 85   Temp: 97.2 °F (36.2 °C)         PHYSICAL EXAM:  Physical Exam  Vitals reviewed.   Constitutional:       General: She is not in acute distress.     Appearance: Normal appearance. She is well-developed.   HENT:      Head: Normocephalic and atraumatic.      Nose: Nose normal.   Eyes:      General: No scleral icterus.     Conjunctiva/sclera:  "Conjunctivae normal.   Neck:      Trachea: No tracheal tenderness or tracheal deviation.   Cardiovascular:      Rate and Rhythm: Normal rate and regular rhythm.      Pulses: Normal pulses.   Pulmonary:      Effort: Pulmonary effort is normal. No accessory muscle usage or respiratory distress.      Breath sounds: Normal breath sounds.   Abdominal:      General: There is no distension.      Palpations: Abdomen is soft.      Tenderness: There is no abdominal tenderness.      Hernia: A hernia (Reducible hernia around the umbilicus however fascial edges difficult to clearly identify, felt to be a least 3 x 3 cm) is present.   Musculoskeletal:         General: No swelling or tenderness. Normal range of motion.      Cervical back: Normal range of motion and neck supple. No rigidity.   Skin:     General: Skin is warm and dry.      Coloration: Skin is not jaundiced.      Findings: No erythema.   Neurological:      General: No focal deficit present.      Mental Status: She is alert and oriented to person, place, and time.      Motor: No weakness or abnormal muscle tone.   Psychiatric:         Mood and Affect: Mood normal.         Behavior: Behavior normal.         Thought Content: Thought content normal.         Judgment: Judgment normal.           LABS:  Lab Results   Component Value Date    WBC 13.90 (H) 10/25/2020    RBC 4.34 10/25/2020    HGB 12.9 10/25/2020    HCT 38.5 10/25/2020     10/25/2020     Lab Results   Component Value Date    GLU 85 08/16/2024     08/16/2024    K 4.4 08/16/2024     08/16/2024    CO2 30 (H) 08/16/2024    BUN 13 08/16/2024    CREATININE 0.8 08/16/2024    CALCIUM 10.0 08/16/2024     Lab Results   Component Value Date    ALT 20 10/25/2020    AST 19 10/25/2020    ALKPHOS 65 10/25/2020    BILITOT 1.3 (H) 10/25/2020     No results found for: "MG", "PHOS"    STUDIES:  Images and reports were personally reviewed.  Outside CT imaging results reviewed, no mention of hernia which is " easily palpable      ASSESSMENT & PLAN:  74 y.o. female with incisional hernia  -based off physical exam I feel the patient has a least a 3 x 3 cm incisional hernia which is reducible at this time  -reasonable candidate for robotic approach with mesh however uncertain about intra-abdominal adhesive disease from previous surgeries therefore I have possibility of needing to convert to open  -versus surgery including pain, bleeding, scarring, infection, recurrence, seroma, hematoma, injury to bowel, injury to other organs were reviewed and patient expressed understanding these risks  -will schedule for repair on 06/10/2025  -labs and EKG               [1]   Patient Active Problem List  Diagnosis    Diverticulitis    Ingrown nail    Pain of right great toe    Chronic constipation    Rectocele, female    Urinary incontinence, mixed    Vaginal atrophy    Brain mass    Urinary urgency    Diverticulosis    Myalgia of pelvic floor    Cystocele, midline    Umbilical hernia without obstruction and without gangrene    Pelvic floor dysfunction   [2]   Social History  Tobacco Use    Smoking status: Never    Smokeless tobacco: Never   Substance Use Topics    Alcohol use: Yes     Comment: occasionally    Drug use: No   [3]   Current Outpatient Medications on File Prior to Visit   Medication Sig Dispense Refill    aspirin (ECOTRIN) 81 MG EC tablet Take 1 tablet by mouth every morning.      Ca carb-magnesium-caps-deondre 400--50 mg Tab Take by mouth.      cyanocobalamin (VITAMIN B-12) 100 MCG tablet Take 500 mcg by mouth.      diclofenac sodium (VOLTAREN) 1 % Gel Apply 1 application  topically 4 (four) times daily.      estradioL (ESTRACE) 0.01 % (0.1 mg/gram) vaginal cream 0.5 grams with applicator or dime-sized amount with finger in vagina nightly x 2 weeks, then twice a week thereafter 42.5 g 11    fluticasone propionate (FLONASE) 50 mcg/actuation nasal spray 1 spray by Nasal route.      hydroCHLOROthiazide (HYDRODIURIL) 25  MG tablet Take 25 mg by mouth.      Lactobacillus rhamnosus GG (CULTURELLE) 10 billion cell capsule Take 1 capsule by mouth once daily.      lutein 10 mg Tab Take 20 mg by mouth.      multivitamin with minerals (HAIR,SKIN AND NAILS ORAL) Take by mouth.      multivitamin with minerals tablet Take 1 tablet by mouth once daily.      omega-3 fatty acids/fish oil (FISH OIL-OMEGA-3 FATTY ACIDS) 300-1,000 mg capsule Take 1,000 mg by mouth.      telmisartan (MICARDIS) 80 MG Tab Take 80 mg by mouth.      zinc sulfate (ZINC-15) 66 mg Tab zinc      amLODIPine (NORVASC) 5 MG tablet Take 1 tablet by mouth once daily. (Patient not taking: Reported on 5/16/2025)      rosuvastatin (CRESTOR) 10 MG tablet Take 10 mg by mouth every evening. (Patient not taking: Reported on 5/16/2025)       No current facility-administered medications on file prior to visit.

## 2025-06-02 ENCOUNTER — TELEPHONE (OUTPATIENT)
Dept: SURGERY | Facility: CLINIC | Age: 75
End: 2025-06-02
Payer: MEDICARE

## 2025-06-03 ENCOUNTER — HOSPITAL ENCOUNTER (OUTPATIENT)
Dept: PREADMISSION TESTING | Facility: HOSPITAL | Age: 75
Discharge: HOME | End: 2025-06-03
Attending: SURGERY
Payer: MEDICARE

## 2025-06-03 DIAGNOSIS — K43.2 INCISIONAL HERNIA, WITHOUT OBSTRUCTION OR GANGRENE: ICD-10-CM

## 2025-06-03 PROCEDURE — 93010 ELECTROCARDIOGRAM REPORT: CPT | Mod: ,,, | Performed by: GENERAL PRACTICE

## 2025-06-03 PROCEDURE — 93005 ELECTROCARDIOGRAM TRACING: CPT

## 2025-06-03 RX ORDER — DOXYCYCLINE 100 MG/1
100 CAPSULE ORAL DAILY
COMMUNITY
Start: 2025-05-28

## 2025-06-03 RX ORDER — NAPROXEN 250 MG/1
500 TABLET ORAL 2 TIMES DAILY WITH MEALS
COMMUNITY

## 2025-06-03 RX ORDER — LOSARTAN POTASSIUM 50 MG/1
50 TABLET ORAL 2 TIMES DAILY
COMMUNITY

## 2025-06-06 LAB
OHS QRS DURATION: 94 MS
OHS QTC CALCULATION: 433 MS

## 2025-06-09 ENCOUNTER — ANESTHESIA EVENT (OUTPATIENT)
Dept: SURGERY | Facility: HOSPITAL | Age: 75
End: 2025-06-09
Payer: MEDICARE

## 2025-06-10 ENCOUNTER — HOSPITAL ENCOUNTER (OUTPATIENT)
Facility: HOSPITAL | Age: 75
Discharge: HOME | End: 2025-06-10
Attending: SURGERY | Admitting: SURGERY
Payer: MEDICARE

## 2025-06-10 ENCOUNTER — ANESTHESIA (OUTPATIENT)
Dept: SURGERY | Facility: HOSPITAL | Age: 75
End: 2025-06-10
Payer: MEDICARE

## 2025-06-10 DIAGNOSIS — K43.2 INCISIONAL HERNIA: Primary | ICD-10-CM

## 2025-06-10 DIAGNOSIS — K43.2 INCISIONAL HERNIA, WITHOUT OBSTRUCTION OR GANGRENE: ICD-10-CM

## 2025-06-10 PROCEDURE — 63600175 PHARM REV CODE 636 W HCPCS: Mod: JZ | Performed by: ANESTHESIOLOGY

## 2025-06-10 PROCEDURE — 36000710: Performed by: SURGERY

## 2025-06-10 PROCEDURE — 71000015 HC POSTOP RECOV 1ST HR: Performed by: SURGERY

## 2025-06-10 PROCEDURE — 27201423 OPTIME MED/SURG SUP & DEVICES STERILE SUPPLY: Performed by: SURGERY

## 2025-06-10 PROCEDURE — 37000008 HC ANESTHESIA 1ST 15 MINUTES: Performed by: SURGERY

## 2025-06-10 PROCEDURE — 71000033 HC RECOVERY, INTIAL HOUR: Performed by: SURGERY

## 2025-06-10 PROCEDURE — 71000039 HC RECOVERY, EACH ADD'L HOUR: Performed by: SURGERY

## 2025-06-10 PROCEDURE — 49593 RPR AA HRN 1ST 3-10 RDC: CPT | Mod: ,,, | Performed by: SURGERY

## 2025-06-10 PROCEDURE — 25000003 PHARM REV CODE 250: Performed by: ANESTHESIOLOGY

## 2025-06-10 PROCEDURE — 63600175 PHARM REV CODE 636 W HCPCS: Performed by: SURGERY

## 2025-06-10 PROCEDURE — 25000003 PHARM REV CODE 250: Performed by: SURGERY

## 2025-06-10 PROCEDURE — 63600175 PHARM REV CODE 636 W HCPCS: Performed by: ANESTHESIOLOGY

## 2025-06-10 PROCEDURE — 37000009 HC ANESTHESIA EA ADD 15 MINS: Performed by: SURGERY

## 2025-06-10 PROCEDURE — 94799 UNLISTED PULMONARY SVC/PX: CPT

## 2025-06-10 PROCEDURE — 36000711: Performed by: SURGERY

## 2025-06-10 PROCEDURE — 63600175 PHARM REV CODE 636 W HCPCS: Performed by: NURSE ANESTHETIST, CERTIFIED REGISTERED

## 2025-06-10 PROCEDURE — 25000003 PHARM REV CODE 250: Performed by: NURSE ANESTHETIST, CERTIFIED REGISTERED

## 2025-06-10 PROCEDURE — C1781 MESH (IMPLANTABLE): HCPCS | Performed by: SURGERY

## 2025-06-10 PROCEDURE — 71000016 HC POSTOP RECOV ADDL HR: Performed by: SURGERY

## 2025-06-10 DEVICE — COMPOSITE MESH MONOFILAMENT POLYPROPYLENE MESH WITH ABSORBABLE SYNTHETIC FILM AND MARKING
Type: IMPLANTABLE DEVICE | Site: ABDOMEN | Status: FUNCTIONAL
Brand: PARIETENE DS

## 2025-06-10 RX ORDER — SUCCINYLCHOLINE CHLORIDE 20 MG/ML
INJECTION INTRAMUSCULAR; INTRAVENOUS
Status: DISCONTINUED | OUTPATIENT
Start: 2025-06-10 | End: 2025-06-10

## 2025-06-10 RX ORDER — ONDANSETRON HYDROCHLORIDE 2 MG/ML
4 INJECTION, SOLUTION INTRAVENOUS DAILY PRN
Status: DISCONTINUED | OUTPATIENT
Start: 2025-06-10 | End: 2025-06-10 | Stop reason: HOSPADM

## 2025-06-10 RX ORDER — LIDOCAINE HYDROCHLORIDE 20 MG/ML
INJECTION INTRAVENOUS
Status: DISCONTINUED | OUTPATIENT
Start: 2025-06-10 | End: 2025-06-10

## 2025-06-10 RX ORDER — BUPIVACAINE HYDROCHLORIDE AND EPINEPHRINE 2.5; 5 MG/ML; UG/ML
INJECTION, SOLUTION EPIDURAL; INFILTRATION; INTRACAUDAL; PERINEURAL
Status: DISCONTINUED | OUTPATIENT
Start: 2025-06-10 | End: 2025-06-10 | Stop reason: HOSPADM

## 2025-06-10 RX ORDER — ROCURONIUM BROMIDE 10 MG/ML
INJECTION, SOLUTION INTRAVENOUS
Status: DISCONTINUED | OUTPATIENT
Start: 2025-06-10 | End: 2025-06-10

## 2025-06-10 RX ORDER — CEFAZOLIN 2 G/1
2 INJECTION, POWDER, FOR SOLUTION INTRAMUSCULAR; INTRAVENOUS
Status: COMPLETED | OUTPATIENT
Start: 2025-06-10 | End: 2025-06-10

## 2025-06-10 RX ORDER — ONDANSETRON HYDROCHLORIDE 2 MG/ML
4 INJECTION, SOLUTION INTRAVENOUS ONCE AS NEEDED
Status: DISCONTINUED | OUTPATIENT
Start: 2025-06-10 | End: 2025-06-10 | Stop reason: HOSPADM

## 2025-06-10 RX ORDER — METOCLOPRAMIDE HYDROCHLORIDE 5 MG/ML
10 INJECTION INTRAMUSCULAR; INTRAVENOUS ONCE
Status: COMPLETED | OUTPATIENT
Start: 2025-06-10 | End: 2025-06-10

## 2025-06-10 RX ORDER — ONDANSETRON HYDROCHLORIDE 2 MG/ML
INJECTION, SOLUTION INTRAMUSCULAR; INTRAVENOUS
Status: DISCONTINUED | OUTPATIENT
Start: 2025-06-10 | End: 2025-06-10

## 2025-06-10 RX ORDER — GLUCAGON 1 MG
1 KIT INJECTION
Status: DISCONTINUED | OUTPATIENT
Start: 2025-06-10 | End: 2025-06-10 | Stop reason: HOSPADM

## 2025-06-10 RX ORDER — SODIUM CHLORIDE 0.9 % (FLUSH) 0.9 %
3 SYRINGE (ML) INJECTION
Status: DISCONTINUED | OUTPATIENT
Start: 2025-06-10 | End: 2025-06-10 | Stop reason: HOSPADM

## 2025-06-10 RX ORDER — METOCLOPRAMIDE HYDROCHLORIDE 5 MG/5ML
10 SOLUTION ORAL ONCE
Status: DISCONTINUED | OUTPATIENT
Start: 2025-06-10 | End: 2025-06-10

## 2025-06-10 RX ORDER — FENTANYL CITRATE 50 UG/ML
25 INJECTION, SOLUTION INTRAMUSCULAR; INTRAVENOUS EVERY 5 MIN PRN
Status: DISCONTINUED | OUTPATIENT
Start: 2025-06-10 | End: 2025-06-10 | Stop reason: HOSPADM

## 2025-06-10 RX ORDER — DEXAMETHASONE SODIUM PHOSPHATE 4 MG/ML
INJECTION, SOLUTION INTRA-ARTICULAR; INTRALESIONAL; INTRAMUSCULAR; INTRAVENOUS; SOFT TISSUE
Status: DISCONTINUED | OUTPATIENT
Start: 2025-06-10 | End: 2025-06-10

## 2025-06-10 RX ORDER — HYDROMORPHONE HYDROCHLORIDE 2 MG/ML
0.2 INJECTION, SOLUTION INTRAMUSCULAR; INTRAVENOUS; SUBCUTANEOUS EVERY 5 MIN PRN
Status: DISCONTINUED | OUTPATIENT
Start: 2025-06-10 | End: 2025-06-10 | Stop reason: HOSPADM

## 2025-06-10 RX ORDER — ACETAMINOPHEN 10 MG/ML
INJECTION, SOLUTION INTRAVENOUS
Status: DISCONTINUED | OUTPATIENT
Start: 2025-06-10 | End: 2025-06-10

## 2025-06-10 RX ORDER — DIPHENHYDRAMINE HYDROCHLORIDE 50 MG/ML
12.5 INJECTION, SOLUTION INTRAMUSCULAR; INTRAVENOUS EVERY 6 HOURS PRN
Status: DISCONTINUED | OUTPATIENT
Start: 2025-06-10 | End: 2025-06-10 | Stop reason: HOSPADM

## 2025-06-10 RX ORDER — PROPOFOL 10 MG/ML
VIAL (ML) INTRAVENOUS
Status: DISCONTINUED | OUTPATIENT
Start: 2025-06-10 | End: 2025-06-10

## 2025-06-10 RX ORDER — HYDROCODONE BITARTRATE AND ACETAMINOPHEN 5; 325 MG/1; MG/1
1 TABLET ORAL EVERY 6 HOURS PRN
Qty: 28 TABLET | Refills: 0 | Status: SHIPPED | OUTPATIENT
Start: 2025-06-10

## 2025-06-10 RX ORDER — FENTANYL CITRATE 50 UG/ML
INJECTION, SOLUTION INTRAMUSCULAR; INTRAVENOUS
Status: DISCONTINUED | OUTPATIENT
Start: 2025-06-10 | End: 2025-06-10

## 2025-06-10 RX ORDER — HYDROCODONE BITARTRATE AND ACETAMINOPHEN 5; 325 MG/1; MG/1
1 TABLET ORAL
Status: DISCONTINUED | OUTPATIENT
Start: 2025-06-10 | End: 2025-06-10 | Stop reason: HOSPADM

## 2025-06-10 RX ORDER — LIDOCAINE HYDROCHLORIDE 10 MG/ML
1 INJECTION, SOLUTION EPIDURAL; INFILTRATION; INTRACAUDAL; PERINEURAL ONCE
Status: DISCONTINUED | OUTPATIENT
Start: 2025-06-10 | End: 2025-06-10 | Stop reason: HOSPADM

## 2025-06-10 RX ORDER — PHENYLEPHRINE HYDROCHLORIDE 10 MG/ML
INJECTION INTRAVENOUS
Status: DISCONTINUED | OUTPATIENT
Start: 2025-06-10 | End: 2025-06-10

## 2025-06-10 RX ORDER — ONDANSETRON 4 MG/1
4 TABLET, ORALLY DISINTEGRATING ORAL EVERY 6 HOURS PRN
Qty: 30 TABLET | Refills: 0 | Status: SHIPPED | OUTPATIENT
Start: 2025-06-10

## 2025-06-10 RX ORDER — PROMETHAZINE HYDROCHLORIDE 25 MG/ML
INJECTION, SOLUTION INTRAMUSCULAR; INTRAVENOUS
Status: DISCONTINUED | OUTPATIENT
Start: 2025-06-10 | End: 2025-06-10

## 2025-06-10 RX ADMIN — HYDROMORPHONE HYDROCHLORIDE 0.2 MG: 2 INJECTION, SOLUTION INTRAMUSCULAR; INTRAVENOUS; SUBCUTANEOUS at 10:06

## 2025-06-10 RX ADMIN — FENTANYL CITRATE 25 MCG: 50 INJECTION INTRAMUSCULAR; INTRAVENOUS at 10:06

## 2025-06-10 RX ADMIN — GLYCOPYRROLATE 0.1 MG: 0.2 INJECTION, SOLUTION INTRAMUSCULAR; INTRAVENOUS at 09:06

## 2025-06-10 RX ADMIN — SODIUM CHLORIDE, SODIUM GLUCONATE, SODIUM ACETATE, POTASSIUM CHLORIDE AND MAGNESIUM CHLORIDE: 526; 502; 368; 37; 30 INJECTION, SOLUTION INTRAVENOUS at 07:06

## 2025-06-10 RX ADMIN — ONDANSETRON 4 MG: 2 INJECTION INTRAMUSCULAR; INTRAVENOUS at 09:06

## 2025-06-10 RX ADMIN — SUGAMMADEX 200 MG: 100 INJECTION, SOLUTION INTRAVENOUS at 09:06

## 2025-06-10 RX ADMIN — METOCLOPRAMIDE 10 MG: 5 INJECTION, SOLUTION INTRAMUSCULAR; INTRAVENOUS at 12:06

## 2025-06-10 RX ADMIN — DEXAMETHASONE SODIUM PHOSPHATE 8 MG: 4 INJECTION, SOLUTION INTRA-ARTICULAR; INTRALESIONAL; INTRAMUSCULAR; INTRAVENOUS; SOFT TISSUE at 08:06

## 2025-06-10 RX ADMIN — LIDOCAINE HYDROCHLORIDE 100 MG: 20 INJECTION, SOLUTION INTRAVENOUS at 09:06

## 2025-06-10 RX ADMIN — PHENYLEPHRINE HYDROCHLORIDE 200 MCG: 10 INJECTION INTRAVENOUS at 08:06

## 2025-06-10 RX ADMIN — HYDROMORPHONE HYDROCHLORIDE 0.2 MG: 2 INJECTION, SOLUTION INTRAMUSCULAR; INTRAVENOUS; SUBCUTANEOUS at 11:06

## 2025-06-10 RX ADMIN — PROPOFOL 150 MG: 10 INJECTION, EMULSION INTRAVENOUS at 08:06

## 2025-06-10 RX ADMIN — FENTANYL CITRATE 50 MCG: 50 INJECTION, SOLUTION INTRAMUSCULAR; INTRAVENOUS at 09:06

## 2025-06-10 RX ADMIN — HYDROCODONE BITARTRATE AND ACETAMINOPHEN 1 TABLET: 5; 325 TABLET ORAL at 10:06

## 2025-06-10 RX ADMIN — SUCCINYLCHOLINE CHLORIDE 150 MG: 20 INJECTION, SOLUTION INTRAMUSCULAR; INTRAVENOUS at 08:06

## 2025-06-10 RX ADMIN — ROCURONIUM BROMIDE 10 MG: 10 INJECTION, SOLUTION INTRAVENOUS at 08:06

## 2025-06-10 RX ADMIN — PROMETHAZINE HYDROCHLORIDE 6.25 MG: 25 INJECTION INTRAMUSCULAR; INTRAVENOUS at 08:06

## 2025-06-10 RX ADMIN — PHENYLEPHRINE HYDROCHLORIDE 200 MCG: 10 INJECTION INTRAVENOUS at 09:06

## 2025-06-10 RX ADMIN — ROCURONIUM BROMIDE 5 MG: 10 INJECTION, SOLUTION INTRAVENOUS at 08:06

## 2025-06-10 RX ADMIN — ACETAMINOPHEN 1000 MG: 10 INJECTION INTRAVENOUS at 08:06

## 2025-06-10 RX ADMIN — FENTANYL CITRATE 50 MCG: 50 INJECTION, SOLUTION INTRAMUSCULAR; INTRAVENOUS at 08:06

## 2025-06-10 RX ADMIN — LIDOCAINE HYDROCHLORIDE 100 MG: 20 INJECTION, SOLUTION INTRAVENOUS at 08:06

## 2025-06-10 RX ADMIN — CEFAZOLIN 2 G: 2 INJECTION, POWDER, FOR SOLUTION INTRAMUSCULAR; INTRAVENOUS at 08:06

## 2025-06-10 RX ADMIN — GLYCOPYRROLATE 0.2 MG: 0.2 INJECTION, SOLUTION INTRAMUSCULAR; INTRAVENOUS at 08:06

## 2025-06-10 RX ADMIN — ROCURONIUM BROMIDE 25 MG: 10 INJECTION, SOLUTION INTRAVENOUS at 08:06

## 2025-06-10 RX ADMIN — ONDANSETRON 4 MG: 2 INJECTION INTRAMUSCULAR; INTRAVENOUS at 08:06

## 2025-06-10 NOTE — ANESTHESIA POSTPROCEDURE EVALUATION
Anesthesia Post Evaluation    Patient: Carleen Anthony    Procedure(s) Performed: Procedure(s) (LRB):  ROBOTIC REPAIR, VENTRAL HERNIA (N/A)    Final Anesthesia Type: general      Patient location during evaluation: PACU  Patient participation: Yes- Able to Participate  Level of consciousness: sedated and awake  Post-procedure vital signs: reviewed and stable  Pain management: adequate  Airway patency: patent    PONV status at discharge: No PONV  Anesthetic complications: no      Cardiovascular status: blood pressure returned to baseline and hemodynamically stable  Respiratory status: spontaneous ventilation  Hydration status: euvolemic  Follow-up not needed.              Vitals Value Taken Time   BP 99/54 06/10/25 10:14   Temp 36.5 °C (97.7 °F) 06/10/25 09:47   Pulse 70 06/10/25 10:18   Resp 12 06/10/25 10:18   SpO2 99 % 06/10/25 10:18   Vitals shown include unfiled device data.      No case tracking events are documented in the log.      Pain/Melita Score: Pain Rating Prior to Med Admin: 6 (6/10/2025 10:02 AM)  Melita Score: 8 (6/10/2025 10:02 AM)

## 2025-06-10 NOTE — ANESTHESIA PREPROCEDURE EVALUATION
06/10/2025  Carleen Anthony is a 74 y.o., female.    Anesthesia Evaluation    I have reviewed the Patient Summary Reports.     I have reviewed the Nursing Notes. I have reviewed the NPO Status.   I have reviewed the Medications.     Review of Systems  Anesthesia Hx:  No problems with previous Anesthesia                Cardiovascular:  Cardiovascular Normal                                              Pulmonary:  Pulmonary Normal                       Renal/:  Renal/ Normal                 Hepatic/GI:      Liver Disease, Hepatitis Hx of diverticulitis; ventral hernia              Neurological:    Neuromuscular Disease,                                   Endocrine:  Endocrine Normal          Morbid Obesity / BMI > 40      Physical Exam  General:  Obesity          Chest/Lungs:  Chest/Lungs Findings:  Clear to auscultation, Normal Respiratory Rate       Heart/Vascular:  Heart Findings: Rate: Normal  Rhythm: Regular Rhythm                        Mental Status:  Mental Status Findings:  Cooperative         Anesthesia Plan  Type of Anesthesia, risks & benefits discussed:  Anesthesia Type:  general    Patient's Preference:   Plan Factors:          Intra-op Monitoring Plan: standard ASA monitors  Intra-op Monitoring Plan Comments:   Post Op Pain Control Plan: multimodal analgesia and IV/PO Opioids PRN  Post Op Pain Control Plan Comments:     Induction:   IV  Beta Blocker:         Informed Consent: Informed consent signed with the Patient and all parties understand the risks and agree with anesthesia plan.  All questions answered.  Anesthesia consent signed with patient.  ASA Score: 3     Day of Surgery Review of History & Physical: I have interviewed and examined the patient. I have reviewed the patient's H&P dated:              Ready For Surgery From Anesthesia Perspective.             Physical Exam  General:  Obesity    Chest/Lungs:  Clear to auscultation, Normal Respiratory Rate    Heart:  Rate: Normal  Rhythm: Regular Rhythm        Anesthesia Plan  Type of Anesthesia, risks & benefits discussed:    Anesthesia Type: general  Intra-op Monitoring Plan: standard ASA monitors  Post Op Pain Control Plan: multimodal analgesia and IV/PO Opioids PRN  Induction:  IV  Informed Consent: Informed consent signed with the Patient and all parties understand the risks and agree with anesthesia plan.  All questions answered.   ASA Score: 3  Day of Surgery Review of History & Physical: I have interviewed and examined the patient. I have reviewed the patient's H&P dated:     Ready For Surgery From Anesthesia Perspective.     .

## 2025-06-10 NOTE — DISCHARGE SUMMARY
[facility]  Discharge Note  Short Stay    Procedure(s) (LRB):  ROBOTIC REPAIR, VENTRAL HERNIA (N/A)      OUTCOME: Patient tolerated treatment/procedure well without complication and is now ready for discharge.    DISPOSITION: Home or Self Care    FINAL DIAGNOSIS:  <principal problem not specified>    FOLLOWUP: In clinic    DISCHARGE INSTRUCTIONS:    Discharge Procedure Orders   Diet Adult Regular     Ice to affected area     Lifting restrictions   Order Comments: Please avoid lifting greater than 40 lb, straining, strenuous activity for six weeks.     Change dressing (specify)   Order Comments: Post-Operative Wound Care    A surgical glue has been placed over your incisions, please leave the glue in place and do not attempt to remove it.  It is ok to shower using mild soap and water over the incisions the day after your procedure. Pat dry your incisions. Do not soak in a bathtub or other body of water for 2 weeks or until cleared by your surgeon.     If you noticed redness, swelling, fever, increasing pain or significant drainage from your wound please call/message the office or the 24 hr nurse hotline after hours.     Notify your health care provider if you experience any of the following:  temperature >100.4     Notify your health care provider if you experience any of the following:  persistent nausea and vomiting or diarrhea     Notify your health care provider if you experience any of the following:  severe uncontrolled pain     Notify your health care provider if you experience any of the following:  redness, tenderness, or signs of infection (pain, swelling, redness, odor or green/yellow discharge around incision site)     Notify your health care provider if you experience any of the following:  worsening rash     Notify your health care provider if you experience any of the following:  increased confusion or weakness     Shower on day dressing removed (No bath)        TIME SPENT  ON DISCHARGE: 10 minutes

## 2025-06-10 NOTE — ANESTHESIA PROCEDURE NOTES
Intubation    Date/Time: 6/10/2025 8:29 AM    Performed by: Brice Galvan CRNA  Authorized by: Christian David MD    Intubation:     Induction:  Intravenous    Intubated:  Postinduction    Mask Ventilation:  Easy mask    Attempts:  1    Attempted By:  CRNA    Method of Intubation:  Video laryngoscopy    Blade:  Moraes 3    Laryngeal View Grade: Grade I - full view of cords      Difficult Airway Encountered?: No      Complications:  None    Airway Device:  Oral endotracheal tube    Airway Device Size:  7.5    Style/Cuff Inflation:  Cuffed    Tube secured:  22    Secured at:  The lips    Placement Verified By:  Capnometry    Complicating Factors:  None    Findings Post-Intubation:  BS equal bilateral

## 2025-06-10 NOTE — PLAN OF CARE
Arrived with son, plan of care reviewed and warm blankets provided. Family set up for text messages

## 2025-06-10 NOTE — TRANSFER OF CARE
"Anesthesia Transfer of Care Note    Patient: Carleen Anthony    Procedure(s) Performed: Procedure(s) (LRB):  ROBOTIC REPAIR, VENTRAL HERNIA (N/A)    Patient location: PACU    Anesthesia Type: general    Transport from OR: Transported from OR on 2-3 L/min O2 by NC with adequate spontaneous ventilation    Post pain: adequate analgesia    Post assessment: no apparent anesthetic complications    Post vital signs: stable    Level of consciousness: responds to stimulation    Nausea/Vomiting: no nausea/vomiting    Complications: none    Transfer of care protocol was followed      Last vitals: Visit Vitals  BP (!) 144/85 (BP Location: Right arm, Patient Position: Lying)   Pulse 76   Temp 36.7 °C (98.1 °F) (Skin)   Resp 19   Ht 5' 4" (1.626 m)   Wt 103.4 kg (228 lb)   SpO2 97%   Breastfeeding No   BMI 39.14 kg/m²     "

## 2025-06-10 NOTE — OP NOTE
DATE OF PROCEDURE: 06/10/2025    PREOPERATIVE DIAGNOSIS: Incisional hernia    POSTOPERATIVE DIAGNOSIS: Incisional hernia 3.5 x 3 cm fascial defect    PROCEDURE: Robotic repair of incisional hernia with mesh (IPOM+)  Robotic assisted lysis of adhesions    SURGEON: Kaden Velez M.D    ASSISTANT: Chayito Jacobson    ANESTHESIA: General    ESTIMATED BLOOD LOSS: Minimal    SPECIMEN: None    CONDITION: Stable    COMPLICATIONS: Injury to left superior epigastric artery with Veress needle requiring suture ligation    FINDINGS:   1. Upon entry we had injury with the Veress needle to either the left superior epigastric artery or branch, this has had a small pulsatile bleed which was controlled by passing a 0 Vicryl using a Mayco-Halie device to ligated  2. Incarcerated omentum through Swiss cheese style incisional defect surrounding the umbilicus, total defect size with 3.5 cm tall by 3 cm wide  3. Primary closure of defect performed with reinforcement with a 12 x 12 cm circular coated mesh   4. A piece of densely adherent small bowel to the right lower wall was lysed     INDICATIONS: The patient is a 74-year-old female with a history of previous colectomy who presented with a bulge in his of the umbilicus which has been enlarging. Counseled on options agreed proceed with robotic repair with mesh.    PROCEDURE IN DETAIL: The patient is taken operating room placed supine position where general endotracheal intubation was achieved. Her abdomen was prepped and draped typical sterile fashion.  She received perioperative antibiotics.  A time-out performed by members of the operative team. A Veress needle was inserted at keen's point attached insufflation.  We had appropriate initial pressures and pneumoperitoneum was achieved. In the right upper lateral abdomen local anesthetic was injected and a stab incision was made.  An 8 mm robotic trocar was placed using Optiview technique. Our entry site was without issue.  We did  note however we had a small pulsatile bleeding from our Veress insertion site which likely injured either the superior epigastric were branch of it. The skin was slightly opened with a scalpel at this location and using a 0 Vicryl and Mayco-Halie device we ligated this area which provided hemostasis.  This has very minimal blood loss from this. We then examined the remainder of the abdomen.  Large amount of omentum was herniated through the fascial defect an additional omentum was adherent to the anterior abdominal wall.  This has also a portion of the small bowel densely adherent to the right lower quadrant. Additional local anesthetic was injected an additional 8 mm trocars were placed in the right mid lateral and right lower lateral abdomen under direct visualization. The robot was then docked. Initially I began by dissecting the omentum off of the anterior abdominal wall.  I then encountered of the hernia which had incarcerated omentum and Swiss cheese style defects.  This has completely reduced revealing a 3.5 cm tall by 3 cm wide defect. Once this has completed a turned my attention to the right lower quadrant were small bowel was densely adherent to itself in the sidewall. Using sharp dissection with scissors this was lysed and freed.  This has no obvious entry to the bowel and no overall issue with the viability of the bowel. I then decrease the abdomen insufflation to 12 mmHg.  The fascial defect was closed with a running 0 Stratafix suture. We then placed a 12 x 12 cm coated circular mesh and centered it over the fascial defect. It laid flat against it in the anterior abdominal wall.  This has then sutured in place with the absorbable V lock sutures. All needle and suture material were removed.  Hemostasis was adequate. Robot was undocked.  Abdomen was desufflated and the skin incisions were closed with a Monocryl subcuticular stitches and Dermabond.  Patient was then aroused from sedation, extubated  taken to recovery room in stable condition have suffered no complications.  All counts correct x2 the case. I was present and scrubbed throughout all operative portions except for final skin closure.    DISPO: PACU

## 2025-06-11 VITALS
OXYGEN SATURATION: 96 % | BODY MASS INDEX: 38.93 KG/M2 | TEMPERATURE: 98 F | HEART RATE: 77 BPM | SYSTOLIC BLOOD PRESSURE: 97 MMHG | RESPIRATION RATE: 12 BRPM | DIASTOLIC BLOOD PRESSURE: 62 MMHG | WEIGHT: 228 LBS | HEIGHT: 64 IN

## 2025-06-17 ENCOUNTER — TELEPHONE (OUTPATIENT)
Dept: SURGERY | Facility: CLINIC | Age: 75
End: 2025-06-17
Payer: MEDICARE

## 2025-06-17 DIAGNOSIS — Z87.19 S/P REPAIR OF VENTRAL HERNIA: Primary | ICD-10-CM

## 2025-06-17 DIAGNOSIS — Z98.890 S/P REPAIR OF VENTRAL HERNIA: Primary | ICD-10-CM

## 2025-06-17 RX ORDER — CYCLOBENZAPRINE HCL 5 MG
5 TABLET ORAL 3 TIMES DAILY PRN
Qty: 30 TABLET | Refills: 0 | Status: SHIPPED | OUTPATIENT
Start: 2025-06-17 | End: 2025-06-27

## 2025-06-17 RX ORDER — KETOROLAC TROMETHAMINE 10 MG/1
10 TABLET, FILM COATED ORAL EVERY 8 HOURS
Qty: 15 TABLET | Refills: 0 | Status: SHIPPED | OUTPATIENT
Start: 2025-06-17 | End: 2025-06-22

## 2025-06-17 NOTE — TELEPHONE ENCOUNTER
Copied from CRM #[id number]. Topic: General Inquiry - Patient Advice  >> Jun 17, 2025 11:04 AM Jimmy wrote:  Type:  Needs Medical Advice    Who Called: Pt    Pharmacy name and phone #:    [id number] - [place], [place] - [address] [facility] [address]  [address]  [place] [address]  Phone: [phone] Fax: [phone]  Would the patient rather a call back or a response via [facility]? Call  Best Call Back Number: [phone]   Additional Information: Pt having a lot of back pain following her procedure on 6/10, and she would like to know if there is something he can prescribe her. She adv her back has tightened up since she hasn't been able to move a lot. Pls call back and adv. Thank you.

## 2025-06-25 ENCOUNTER — OFFICE VISIT (OUTPATIENT)
Dept: SURGERY | Facility: CLINIC | Age: 75
End: 2025-06-25
Payer: MEDICARE

## 2025-06-25 VITALS — DIASTOLIC BLOOD PRESSURE: 80 MMHG | HEART RATE: 74 BPM | TEMPERATURE: 97 F | SYSTOLIC BLOOD PRESSURE: 129 MMHG

## 2025-06-25 DIAGNOSIS — Z98.890 S/P REPAIR OF VENTRAL HERNIA: Primary | ICD-10-CM

## 2025-06-25 DIAGNOSIS — Z87.19 S/P REPAIR OF VENTRAL HERNIA: Primary | ICD-10-CM

## 2025-06-25 PROCEDURE — 99999 PR PBB SHADOW E&M-EST. PATIENT-LVL III: CPT | Mod: PBBFAC,,, | Performed by: SURGERY

## 2025-06-25 PROCEDURE — 4010F ACE/ARB THERAPY RXD/TAKEN: CPT | Mod: CPTII,S$GLB,, | Performed by: SURGERY

## 2025-06-25 PROCEDURE — 99212 OFFICE O/P EST SF 10 MIN: CPT | Mod: S$GLB,,, | Performed by: SURGERY

## 2025-06-25 PROCEDURE — 3079F DIAST BP 80-89 MM HG: CPT | Mod: CPTII,S$GLB,, | Performed by: SURGERY

## 2025-06-25 PROCEDURE — 1126F AMNT PAIN NOTED NONE PRSNT: CPT | Mod: CPTII,S$GLB,, | Performed by: SURGERY

## 2025-06-25 PROCEDURE — 1159F MED LIST DOCD IN RCRD: CPT | Mod: CPTII,S$GLB,, | Performed by: SURGERY

## 2025-06-25 PROCEDURE — 3074F SYST BP LT 130 MM HG: CPT | Mod: CPTII,S$GLB,, | Performed by: SURGERY

## 2025-06-25 NOTE — PROGRESS NOTES
GENERAL SURGERY  POST-OP PROGRESS NOTE    HPI: Carleen Anthony is a 74 y.o. female status post robotic repair with mesh of an incisional hernia here for follow-up. Had 3.5 cm Swiss cheese style defects primarily closed and repaired with a 12 x 12 cm coated mesh. Overall doing well.  Some mild incisional pain persist but mostly well controlled. No significant swelling or redness of the abdominal wall.  Tolerating diet.    VITALS:  Vitals:    06/25/25 1030   BP: 129/80   Pulse: 74   Temp: 97.4 °F (36.3 °C)       PHYSICAL EXAM:  GEN: No acute distress, alert orient x3  HEENT: Anicteric sclera  CV: Regular rate rhythm  RESP: Nonlabored breathing  ABD: Robotic trocar sites well healed without signs of infection or breakdown, mid abdominal hernia site without significant seroma or hematoma  EXT: No edema      ASSESSMENT & PLAN:  74 y.o. female s/p robotic repair of incisional hernia with mesh  -overall doing well  -no sign of significant seroma, hematoma mesh infection  -recommend against straining and heavy lifting for a total of 6 weeks from the day of surgery  -return to clinic as needed

## 2025-08-29 ENCOUNTER — TELEPHONE (OUTPATIENT)
Dept: UROGYNECOLOGY | Facility: CLINIC | Age: 75
End: 2025-08-29
Payer: MEDICARE

## (undated) DEVICE — PACK SIRUS BASIC V SURG STRL

## (undated) DEVICE — SET TUBE PNEUMOCLEAR SE HI FLO

## (undated) DEVICE — DRAPE MAJOR ABD 102X122X78IN

## (undated) DEVICE — BLADE SURG CARBON STEEL SZ11

## (undated) DEVICE — ADHESIVE DERMABOND ADVANCED

## (undated) DEVICE — OBTURATOR BLADELESS 8MM XI CLR

## (undated) DEVICE — NDL INSUF ULTRA VERESS 120MM

## (undated) DEVICE — GOWN POLY REINF BRTH SLV XL

## (undated) DEVICE — SYR 10CC LUER LOCK

## (undated) DEVICE — PACK CUSTOM UNIV BASIN SLI

## (undated) DEVICE — GLOVE SENSICARE PI ALOE 7

## (undated) DEVICE — GOWN POLY REINF X-LONG XL

## (undated) DEVICE — COVER TIP CURVED SCISSORS XI

## (undated) DEVICE — SUT MCRYL PLUS 4-0 PS2 27IN

## (undated) DEVICE — KIT PROCEDURE STER INLET CLOSU

## (undated) DEVICE — SUT V-LOC 180 2-0 GS-22 9IN

## (undated) DEVICE — DRAPE ARM DAVINCI XI

## (undated) DEVICE — SEAL CANN UNIVERSAL 5-12MM

## (undated) DEVICE — SOL IRRI STRL WATER 1000ML

## (undated) DEVICE — SUT STRATAFIX PDS+ 1 CTX 24IN

## (undated) DEVICE — STRAP OR TABLE 5IN X 72IN

## (undated) DEVICE — SOL ELECTROLUBE ANTI-STIC

## (undated) DEVICE — ELECTRODE MEGADYNE RETURN DUAL

## (undated) DEVICE — SOL CLEARIFY VISUALIZATION LAP

## (undated) DEVICE — SLEEVE SCD EXPRESS KNEE MEDIUM

## (undated) DEVICE — SUT VICRYL+ 27 UR-6 VIOL

## (undated) DEVICE — TOWEL OR DISP STRL BLUE 4/PK

## (undated) DEVICE — DRAPE COLUMN DAVINCI XI